# Patient Record
Sex: FEMALE | Race: WHITE | NOT HISPANIC OR LATINO | Employment: FULL TIME | URBAN - METROPOLITAN AREA
[De-identification: names, ages, dates, MRNs, and addresses within clinical notes are randomized per-mention and may not be internally consistent; named-entity substitution may affect disease eponyms.]

---

## 2017-09-28 ENCOUNTER — TRANSCRIBE ORDERS (OUTPATIENT)
Dept: URGENT CARE | Facility: MEDICAL CENTER | Age: 32
End: 2017-09-28

## 2017-09-28 ENCOUNTER — APPOINTMENT (OUTPATIENT)
Dept: LAB | Facility: MEDICAL CENTER | Age: 32
End: 2017-09-28

## 2017-09-28 ENCOUNTER — APPOINTMENT (OUTPATIENT)
Dept: URGENT CARE | Facility: MEDICAL CENTER | Age: 32
End: 2017-09-28

## 2017-09-28 DIAGNOSIS — Z02.1 PHYSICAL EXAM, PRE-EMPLOYMENT: ICD-10-CM

## 2017-09-28 DIAGNOSIS — Z02.1 PHYSICAL EXAM, PRE-EMPLOYMENT: Primary | ICD-10-CM

## 2017-09-28 PROCEDURE — 86787 VARICELLA-ZOSTER ANTIBODY: CPT

## 2017-09-28 PROCEDURE — 86480 TB TEST CELL IMMUN MEASURE: CPT

## 2017-09-28 PROCEDURE — 90715 TDAP VACCINE 7 YRS/> IM: CPT

## 2017-09-28 PROCEDURE — 36415 COLL VENOUS BLD VENIPUNCTURE: CPT

## 2017-09-30 LAB
ANNOTATION COMMENT IMP: NORMAL
GAMMA INTERFERON BACKGROUND BLD IA-ACNC: 0.05 IU/ML
M TB IFN-G BLD-IMP: NEGATIVE
M TB IFN-G CD4+ BCKGRND COR BLD-ACNC: <0.01 IU/ML
M TB IFN-G CD4+ T-CELLS BLD-ACNC: 0.04 IU/ML
MITOGEN IGNF BLD-ACNC: 9.26 IU/ML
QUANTIFERON-TB GOLD IN TUBE: NORMAL
SERVICE CMNT-IMP: NORMAL

## 2017-10-03 LAB — VZV IGG SER IA-ACNC: NORMAL

## 2018-01-11 ENCOUNTER — OFFICE VISIT (OUTPATIENT)
Dept: URGENT CARE | Facility: MEDICAL CENTER | Age: 33
End: 2018-01-11
Payer: COMMERCIAL

## 2018-01-11 ENCOUNTER — APPOINTMENT (OUTPATIENT)
Dept: RADIOLOGY | Facility: MEDICAL CENTER | Age: 33
End: 2018-01-11
Payer: COMMERCIAL

## 2018-01-11 DIAGNOSIS — S67.10XA CRUSHING INJURY OF FINGER: ICD-10-CM

## 2018-01-11 PROCEDURE — 73140 X-RAY EXAM OF FINGER(S): CPT

## 2018-01-11 PROCEDURE — S9088 SERVICES PROVIDED IN URGENT: HCPCS

## 2018-01-11 PROCEDURE — 99204 OFFICE O/P NEW MOD 45 MIN: CPT

## 2018-01-12 NOTE — PROGRESS NOTES
Assessment   1  Injury, crush, finger (927 3) (S67 10XA)   2  Subungual hematoma, fingernail (923 3) (S60 10XA)    Plan   Injury, crush, finger    · Motrin  MG Oral Tablet; 600 mg PO NOW; To Be Done: [id number]   · * XR FINGER RIGHT SECOND DIGIT-INDEX; Status:Complete - Retrospective By    Protocol Authorization;   Done: [id number] 10:05AM   · Finger splint; Status:Complete;   Done: [id number]    Discussion/Summary   Discussion Summary:    34 yo F with R 1st digit traumatic subungal hematoma s/p trephination with electrocautery  on: stretches, gentle massage  and NSAIDs as needed  ice locally  affected extremity when at rest   locally to nailbed and to avoid submerging R hand in water  up with PCP as discussed or go to nearest ED if exacerbation of symptoms  Medication Side Effects Reviewed: Possible side effects of new medications were reviewed with the patient/guardian today  Counseling Documentation With Imm: The patient was counseled regarding instructions for management,-- impressions  Follow Up Instructions: Follow Up with your Primary Care Provider in 3-5 days  If your symptoms worsen, go to the nearest [place] [address] Emergency Department  Chief Complaint   1  Finger Problem  Chief Complaint Free Text Note Form: Slammed right index finger in car door this am finger has slight blood around nail bed and blood under nail  History of Present Illness   HPI: Patient presents today with R index finger injury  Reports slammed finger in car door this AM  No otc meds taken  Reports pain and tingling locally  No additional symptoms present  Hospital Based Practices Required Assessment:      Pain Assessment      the patient states they have pain  (on a scale of 0 to 10, the patient rates the pain at 4 )      Abuse And Domestic Violence Screen       Yes, the patient is safe at home  -- The patient states no one is hurting them  Depression And Suicide Screen   No, the patient has not had thoughts of hurting themself  No, the patient has not felt depressed in the past 7 days  Prefered Language is  [place]  Primary Language is  English  Review of Systems   Focused-Female:      Constitutional: No fever, no chills, feels well, no tiredness, no recent weight gain or loss  Cardiovascular: no complaints of slow or fast heart rate, no chest pain, no palpitations, no leg claudication or lower extremity edema  Respiratory: no complaints of shortness of breath, no wheezing, no dyspnea on exertion, no orthopnea or PND  Integumentary: skin wound  Active Problems   1  Injury, crush, finger (927 3) (S67 10XA)    Past Medical History   Active Problems And Past Medical History Reviewed: The active problems and past medical history were reviewed and updated today  Family History   Family History Reviewed: The family history was reviewed and updated today  Social History   Social History Reviewed: The social history was reviewed and updated today  The social history was reviewed and is unchanged  Surgical History   Surgical History Reviewed: The surgical history was reviewed and updated today  Current Meds   Medication List Reviewed: The medication list was reviewed and updated today  Allergies   1  No Known Drug Allergies    Vitals   Signs   Recorded: [id number] 10:04AM   Temperature: 96 5 F  Heart Rate: 74  Respiration: 16  Systolic: 986, LUE, Sitting  Diastolic: 72, LUE, Sitting  BP Cuff Size: Standard  BP Comments: Tony  O2 Saturation: 99  Pain Scale: 4    Physical Exam        Constitutional      General appearance: No acute distress, well appearing and well nourished  -- Pleasant and cooperative  No acute distress  Eyes      Conjunctiva and lids: No swelling, erythema or discharge  Pulmonary      Respiratory effort: No increased work of breathing or signs of respiratory distress         Auscultation of lungs: Clear to auscultation  Cardiovascular      Palpation of heart: Normal PMI, no thrills  Auscultation of heart: Normal rate and rhythm, normal S1 and S2, without murmurs  Examination of extremities for edema and/or varicosities: Normal        Musculoskeletal      Gait and station: Normal        Digits and nails: Abnormal  -- Nailbed of R index finger noted to have hematoma under nail, TTP locally as well as over DIP  + sensation, + cap refill, + pulses, Remainder of R hand wnl  Skin      Skin and subcutaneous tissue: Abnormal        Psychiatric      Orientation to person, place, and time: Normal        Mood and affect: Normal        Results/Data   * XR FINGER RIGHT SECOND DIGIT-INDEX [id number] 10:05AM Ge Simon Order Number: [id number]      Performing Comments: room 5      Test Name Result Flag Reference   XR FINGER RIGHT SECOND DIGIT-INDEX (Report)     RIGHT FINGER           INDICATION: Trauma  Injured 2nd finger in car door  Bruising and bleeding at nail bed  COMPARISON: None           VIEWS:  PA view hand and 2 cone-down views of the right 2nd digit           IMAGES: 3           For the purposes of institution wide universal language the following terms will apply: (thumb=1st digit/finger, index finger=2nd digit/finger, long finger=3rd digit/finger, ring=4th digit/finger and small finger=5th digit/finger)           FINDINGS:           There is no acute fracture or dislocation  No significant degenerative changes  No lytic or blastic lesion  Soft tissues are unremarkable  IMPRESSION:      No acute fracture or radiopaque foreign body  Workstation performed: [id number]           Signed by:      Jeremiah Kelly DO      1/11/18        Provider Comments   Provider Comments: In office x ray of R index finger interpreted as negative  Finger splint applied   Recommend symptomatic management and follow up with PCP if symptoms persist       Signatures    Electronically signed by : Erik Swann, [address]; Jan 11 2018 11:24AM EST                       (Author)     Electronically signed by : ERICKA Dolan ; Jan 11 2018 12:11PM EST

## 2018-01-23 VITALS
RESPIRATION RATE: 16 BRPM | DIASTOLIC BLOOD PRESSURE: 72 MMHG | OXYGEN SATURATION: 99 % | HEART RATE: 74 BPM | TEMPERATURE: 96.5 F | SYSTOLIC BLOOD PRESSURE: 108 MMHG

## 2018-03-22 ENCOUNTER — TELEPHONE (OUTPATIENT)
Dept: NEUROLOGY | Facility: CLINIC | Age: 33
End: 2018-03-22

## 2018-05-16 ENCOUNTER — OFFICE VISIT (OUTPATIENT)
Dept: NEUROLOGY | Facility: CLINIC | Age: 33
End: 2018-05-16
Payer: COMMERCIAL

## 2018-05-16 VITALS
HEIGHT: 63 IN | BODY MASS INDEX: 23.25 KG/M2 | DIASTOLIC BLOOD PRESSURE: 65 MMHG | HEART RATE: 74 BPM | WEIGHT: 131.2 LBS | SYSTOLIC BLOOD PRESSURE: 110 MMHG | RESPIRATION RATE: 16 BRPM

## 2018-05-16 DIAGNOSIS — G40.109 LOCALIZATION-RELATED EPILEPSY (HCC): Primary | ICD-10-CM

## 2018-05-16 PROCEDURE — 99205 OFFICE O/P NEW HI 60 MIN: CPT | Performed by: PSYCHIATRY & NEUROLOGY

## 2018-05-16 RX ORDER — LAMOTRIGINE 200 MG/1
200 TABLET ORAL 2 TIMES DAILY
Qty: 180 TABLET | Refills: 3 | Status: SHIPPED | OUTPATIENT
Start: 2018-05-16 | End: 2019-02-01 | Stop reason: SDUPTHER

## 2018-05-16 RX ORDER — LAMOTRIGINE 150 MG/1
TABLET ORAL
COMMUNITY
Start: 2018-03-02 | End: 2018-05-16 | Stop reason: SDUPTHER

## 2018-05-16 RX ORDER — FOLIC ACID 1 MG/1
TABLET ORAL
COMMUNITY
Start: 2017-09-27

## 2018-05-16 NOTE — PROGRESS NOTES
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 [address] )  Based on the description of her episodes, these are most consistent with complex partial seizures, with prior generalized tonic clonic seizures  Her initial aura of jamais vu followed by inability to speak would be suggestive of left temporal onset  She still has been having smaller seizures even when taking this amount of lamotrigine, so I think it would be best to increase her dose further to avoid any additional seizures  -- I will have her increase lamtorigine to 200 mg twice a day  If she continues to have seizures, her dose could be increased further  -- She is planning to get pregnant  We extensively discussed pregnancy issues in women with epilepsy  In general, lamotrigine is not associated with an increased risk of birth defects compared to women not taking any medications  Although I agree that she should be on the lease amount of medication necessary to maintain seizure freedom, she still is having seizures on her current dose of medications  -- after she is at the goal dose of lamotrigine for about 2 weeks, I will have her get a lamotrigine level checked as a baseline if she would get pregnant  During pregnancy, lamotrigine levels often fall, so we will need to monitor her bloodwork to maintain her drug levels if she would get pregnant  -- I discussed seizure first aid, seizure safety, and driving restrictions  Her last seizures was over 6 months ago, so she can continue to drive, but if she would have another seizure with alteration of consciousness, then she cannot drive for 6 months from that event       I spent a total of 60 min with the patient with greater than 50% of that time spent counseling and coordinating her care, specifically discussing her diagnosis,   seizure safety precautions,  seizure first aid, driving restrictions, women's issues/pregancy issues related to epilepsy, as detailed above       She will return to the office in about 3 months  Subjective:    HPI    Current seizure medications:  1  Lamotrigine 150 mg twice a day  Other medications as per Epic    Briefly reviewing her history, she had her first seizure when she was about 13to 17years old, which initially manifested just as staring spells  She eventually saw neurology at [facility] and was started on lamotrigine  As they increased her dose, her events stopped  She was only on this for a few months, and then stopped taking lamotrigine, but within 24 hours of stopping the medication, she had 2 generalized tonic clonic seizures  She does not recall what exactly happened with theses seizures  She was then put back on lamotrigine and continued to take this since then  She continues to have episodes of "petit mal" seizures, where she will get a feeling of jamais vu followed by trouble getting words out and swallowing repetitively  These are rare, occurring about every 6-12 months  Her last episode occurred at least 6 months ago  She has been tolerating Lamotrigine well without any side effects  Her prior neurologist was trying to decrease her dose to reduce the risk of birth defects, as she is considering getting pregnant  Event/Seizure semiology:  1   "petit mal seizures"  Likely represent complex partial seizures  Begin with a feeling of jamais vu followed by inability to speak and "not making sense, often with repetitive swallowing behavior  Occur about 1-2 times a year  Last occurred over 6 months ago  Special Features  Status epilepticus: no  Self Injury Seizures: no  Precipitating Factors: stress or menses    Epilepsy Risk Factors:  Uncomplicated pregnancy with normal development  No learning disabilities or cognitive delay   No h/o febrile seizures, CNS infections, strokes, or CNS neoplasms  There is no family history of seizures or epilepsy  Prior AEDs:  none    Prior Evaluation:  - MRI brain: long time ago  - Routine EEG: long time ago  - Video EEG: none  I reviewed prior records from [facility], as documented in Epic/[employer], and summarized above  The following portions of the patient's history were reviewed and updated as appropriate: allergies, current medications, past family history, past medical history, past social history, past surgical history and problem list          Objective:    Blood pressure 110/65, pulse 74, resp  rate 16, height 5' 3" (1 6 m), weight 59 5 kg (131 lb 3 2 oz)  Physical Exam    Neurological Exam  GENERAL EXAMINATION:   In general patient is well appearing and in no distress  There is no peripheral edema  NEUROLOGIC EXAMINATION:     Alert and oriented to person, date, location  Fund of knowledge is full with good understanding of medical situation  Recent and remote memory were intact    Mood and affect are appropriate  Attention is intact  Language function including fluency, naming, and comprehension intact  Cranial nerves: Pupils are equal round reactive to light and accommodation  Visual Fields are full to confrontation bilaterally  Optic discs are sharp with no evidence of papilledema Extraocular movements are intact without nystagmus  Facial sensation is intact to light touch  No facial droop, face activates symmetrically  There is no dysarthria  Hearing was intact to finger rub  Tongue and uvula are midline and palate elevates symmetrically  Shoulder shrug  5/5  Motor Exam:  No pronator drift  Bulk and tone are normal  Strength is 5/5 throughout  Deep tendon reflexes: Biceps 2+, brachioradialis 2+, patellar 2+, Achilles 2+ bilaterally  Negative Liceas      Sensation: Intact light touch    Coordination: Finger nose finger and heel to shin testing are without dysmetria  Gait: Negative romberg  Normal casual gait  ROS:    Review of Systems   Constitutional: Negative  Negative for appetite change and fever  HENT: Negative  Negative for hearing loss, tinnitus, trouble swallowing and voice change  Eyes: Negative  Negative for photophobia and pain  Respiratory: Negative  Negative for shortness of breath  Cardiovascular: Negative  Negative for palpitations  Gastrointestinal: Negative  Negative for nausea and vomiting  Endocrine: Negative  Negative for cold intolerance and heat intolerance  Genitourinary: Negative  Negative for dysuria, frequency and urgency  Musculoskeletal: Negative  Negative for myalgias and neck pain  Skin: Negative  Negative for rash  Allergic/Immunologic: Negative  Neurological: Negative  Negative for dizziness, tremors, seizures, syncope, facial asymmetry, speech difficulty, weakness, light-headedness, numbness and headaches  Hematological: Negative  Does not bruise/bleed easily  Psychiatric/Behavioral: Positive for sleep disturbance  Negative for confusion and hallucinations

## 2018-05-16 NOTE — PATIENT INSTRUCTIONS
-- Increase your Lamotrigine to 200 mg twice a day  -- Please get a lamotrigine level checked about 2 weeks after you increase the dose  -- If you happen to get pregnant, please call us to give us the good news, but also so we can arrange to follow your Lamotrigine levels through the pregnancy  -- continue to take folic acid daily

## 2018-05-31 NOTE — ASSESSMENT & PLAN NOTE
Based on the description of her episodes, these are most consistent with complex partial seizures, with prior generalized tonic clonic seizures  Her initial aura of jamais vu followed by inability to speak would be suggestive of left temporal onset  She still has been having smaller seizures even when taking this amount of lamotrigine, so I think it would be best to increase her dose further to avoid any additional seizures  -- I will have her increase lamtorigine to 200 mg twice a day  If she continues to have seizures, her dose could be increased further  -- She is planning to get pregnant  We extensively discussed pregnancy issues in women with epilepsy  In general, lamotrigine is not associated with an increased risk of birth defects compared to women not taking any medications  Although I agree that she should be on the lease amount of medication necessary to maintain seizure freedom, she still is having seizures on her current dose of medications  -- after she is at the goal dose of lamotrigine for about 2 weeks, I will have her get a lamotrigine level checked as a baseline if she would get pregnant  During pregnancy, lamotrigine levels often fall, so we will need to monitor her bloodwork to maintain her drug levels if she would get pregnant  -- I discussed seizure first aid, seizure safety, and driving restrictions  Her last seizures was over 6 months ago, so she can continue to drive, but if she would have another seizure with alteration of consciousness, then she cannot drive for 6 months from that event  -- she will continue to take folic aid 1 mg daily to reduce the risk of birth defects

## 2018-07-26 ENCOUNTER — TRANSCRIBE ORDERS (OUTPATIENT)
Dept: ADMINISTRATIVE | Facility: HOSPITAL | Age: 33
End: 2018-07-26

## 2018-07-26 ENCOUNTER — APPOINTMENT (OUTPATIENT)
Dept: LAB | Facility: MEDICAL CENTER | Age: 33
End: 2018-07-26
Payer: COMMERCIAL

## 2018-07-26 DIAGNOSIS — Z00.8 HEALTH EXAMINATION IN POPULATION SURVEY: Primary | ICD-10-CM

## 2018-07-26 DIAGNOSIS — Z00.8 HEALTH EXAMINATION IN POPULATION SURVEY: ICD-10-CM

## 2018-07-26 DIAGNOSIS — G40.109 LOCALIZATION-RELATED EPILEPSY (HCC): ICD-10-CM

## 2018-07-26 LAB
CHOLEST SERPL-MCNC: 262 MG/DL (ref 50–200)
EST. AVERAGE GLUCOSE BLD GHB EST-MCNC: 97 MG/DL
HBA1C MFR BLD: 5 % (ref 4.2–6.3)
HDLC SERPL-MCNC: 73 MG/DL (ref 40–60)
LDLC SERPL CALC-MCNC: 176 MG/DL (ref 0–100)
NONHDLC SERPL-MCNC: 189 MG/DL
TRIGL SERPL-MCNC: 65 MG/DL

## 2018-07-26 PROCEDURE — 80061 LIPID PANEL: CPT

## 2018-07-26 PROCEDURE — 36415 COLL VENOUS BLD VENIPUNCTURE: CPT

## 2018-07-26 PROCEDURE — 80175 DRUG SCREEN QUAN LAMOTRIGINE: CPT

## 2018-07-26 PROCEDURE — 83036 HEMOGLOBIN GLYCOSYLATED A1C: CPT

## 2018-07-30 LAB — LAMOTRIGINE SERPL-MCNC: 8.3 UG/ML (ref 2–20)

## 2018-08-21 ENCOUNTER — TELEPHONE (OUTPATIENT)
Dept: NEUROLOGY | Facility: CLINIC | Age: 33
End: 2018-08-21

## 2018-09-12 ENCOUNTER — ANNUAL EXAM (OUTPATIENT)
Dept: OBGYN CLINIC | Facility: CLINIC | Age: 33
End: 2018-09-12
Payer: COMMERCIAL

## 2018-09-12 VITALS
DIASTOLIC BLOOD PRESSURE: 80 MMHG | HEIGHT: 63 IN | WEIGHT: 129 LBS | SYSTOLIC BLOOD PRESSURE: 110 MMHG | BODY MASS INDEX: 22.86 KG/M2

## 2018-09-12 DIAGNOSIS — N63.11 BREAST LUMP ON RIGHT SIDE AT 10 O'CLOCK POSITION: ICD-10-CM

## 2018-09-12 DIAGNOSIS — R30.0 DYSURIA: ICD-10-CM

## 2018-09-12 DIAGNOSIS — Z01.419 ENCOUNTER FOR ANNUAL ROUTINE GYNECOLOGICAL EXAMINATION: Primary | ICD-10-CM

## 2018-09-12 DIAGNOSIS — Z31.69 INFERTILITY COUNSELING: ICD-10-CM

## 2018-09-12 PROCEDURE — 99385 PREV VISIT NEW AGE 18-39: CPT | Performed by: OBSTETRICS & GYNECOLOGY

## 2018-09-12 PROCEDURE — 87624 HPV HI-RISK TYP POOLED RSLT: CPT | Performed by: OBSTETRICS & GYNECOLOGY

## 2018-09-12 PROCEDURE — G0145 SCR C/V CYTO,THINLAYER,RESCR: HCPCS | Performed by: OBSTETRICS & GYNECOLOGY

## 2018-09-12 RX ORDER — PHENAZOPYRIDINE HYDROCHLORIDE 100 MG/1
100 TABLET, FILM COATED ORAL 3 TIMES DAILY PRN
Qty: 9 TABLET | Refills: 0 | Status: SHIPPED | OUTPATIENT
Start: 2018-09-12 | End: 2018-09-26

## 2018-09-12 RX ORDER — NITROFURANTOIN 25; 75 MG/1; MG/1
100 CAPSULE ORAL 2 TIMES DAILY
Qty: 10 CAPSULE | Refills: 0 | Status: SHIPPED | OUTPATIENT
Start: 2018-09-12 | End: 2018-09-26

## 2018-09-12 NOTE — PROGRESS NOTES
Assessment/Plan:    Pap smear done as well as annual   Encouraged self-breast examination as well as calcium supplementation  Recommend check urinalysis, culture and sensitivity  She will start Macrobid b i d  x5 days as well as Pyridium  Also recommend right breast ultrasound attention upper outer quadrant  I will notify her the above results via telephone  Next we had an extensive conversation regarding primary infertility with workup including labs as well as semen analysis  At this point we will hold until January certainly if she is pregnant she will notify us sooner  No problem-specific Assessment & Plan notes found for this encounter  Diagnoses and all orders for this visit:    Encounter for annual routine gynecological examination  -     Liquid-based pap, screening    Infertility counseling    Dysuria  -     Urinalysis with microscopic; Future  -     Urine culture; Future  -     nitrofurantoin (MACROBID) 100 mg capsule; Take 1 capsule (100 mg total) by mouth 2 (two) times a day  -     phenazopyridine (PYRIDIUM) 100 mg tablet; Take 1 tablet (100 mg total) by mouth 3 (three) times a day as needed for bladder spasms    Breast lump on right side at 10 o'clock position  -     US breast right limited (diagnostic); Future          Subjective:      Patient ID: Diane Bermudez is a 29 y o  female  HPI     This is a 70-year-old female G0 who presents as a new patient for annual well gyn exam   Patient has a significant past medical history of epilepsy  Her last grand mal seizure was approximately 10 years ago  Her last partial seizure was approximately 1 year ago  Her medication has been recently altered  Her neurologist is aware that she is trying to get pregnant for the last 8 months  She states her  has never fathered a child, although and is previous relationship she did have a miscarriage this was approximately 14 years ago     She states her cycles are regular every 28 days lasting 4 days with no breakthrough bleeding  She denies any changes in bowel habits  More recently she is complaining of dysuria with microscopic hematuria  Her last bladder infection was a couple of years ago  She has been in a monogamous relationship for over 5 years  Her Pap smears have been normal   Patient is employed as a  in pediatric neuro development  The following portions of the patient's history were reviewed and updated as appropriate: allergies, current medications, past family history, past medical history, past social history, past surgical history and problem list     Review of Systems   Constitutional: Negative for fatigue, fever and unexpected weight change  Respiratory: Negative for cough, chest tightness, shortness of breath and wheezing  Cardiovascular: Negative  Negative for chest pain and palpitations  Gastrointestinal: Negative  Negative for abdominal distention, abdominal pain, blood in stool, constipation, diarrhea, nausea and vomiting  Genitourinary: Positive for dysuria  Negative for difficulty urinating, dyspareunia, flank pain, frequency, genital sores, hematuria, pelvic pain, urgency, vaginal bleeding, vaginal discharge and vaginal pain  Skin: Negative for rash  Objective:      /80   Ht 5' 3" (1 6 m)   Wt 58 5 kg (129 lb)   LMP 08/21/2018   Breastfeeding? No   BMI 22 85 kg/m²          Physical Exam   Constitutional: She appears well-developed and well-nourished  Cardiovascular: Normal rate and regular rhythm  Pulmonary/Chest: Effort normal and breath sounds normal  Right breast exhibits mass  Right breast exhibits no inverted nipple, no nipple discharge, no skin change and no tenderness  Left breast exhibits no inverted nipple, no mass, no nipple discharge, no skin change and no tenderness  + upper outer quadrant   Abdominal: Soft  Bowel sounds are normal  She exhibits no distension  There is no tenderness   There is no rebound and no guarding  Genitourinary: Vagina normal and uterus normal  There is no lesion on the right labia  There is no lesion on the left labia  Cervix exhibits no discharge and no friability  Right adnexum displays no mass, no tenderness and no fullness  Left adnexum displays no mass, no tenderness and no fullness  No vaginal discharge found

## 2018-09-13 ENCOUNTER — APPOINTMENT (OUTPATIENT)
Dept: LAB | Facility: HOSPITAL | Age: 33
End: 2018-09-13
Attending: OBSTETRICS & GYNECOLOGY
Payer: COMMERCIAL

## 2018-09-13 DIAGNOSIS — R30.0 DYSURIA: ICD-10-CM

## 2018-09-13 LAB
BACTERIA UR QL AUTO: ABNORMAL /HPF
BILIRUB UR QL STRIP: NEGATIVE
CLARITY UR: ABNORMAL
COLOR UR: YELLOW
GLUCOSE UR STRIP-MCNC: NEGATIVE MG/DL
HGB UR QL STRIP.AUTO: ABNORMAL
HYALINE CASTS #/AREA URNS LPF: ABNORMAL /LPF
KETONES UR STRIP-MCNC: NEGATIVE MG/DL
LEUKOCYTE ESTERASE UR QL STRIP: ABNORMAL
NITRITE UR QL STRIP: POSITIVE
NON-SQ EPI CELLS URNS QL MICRO: ABNORMAL /HPF
PH UR STRIP.AUTO: 6 [PH] (ref 4.5–8)
PROT UR STRIP-MCNC: NEGATIVE MG/DL
RBC #/AREA URNS AUTO: ABNORMAL /HPF
SP GR UR STRIP.AUTO: 1.01 (ref 1–1.03)
UROBILINOGEN UR QL STRIP.AUTO: 0.2 E.U./DL
WBC #/AREA URNS AUTO: ABNORMAL /HPF

## 2018-09-13 PROCEDURE — 87077 CULTURE AEROBIC IDENTIFY: CPT

## 2018-09-13 PROCEDURE — 87086 URINE CULTURE/COLONY COUNT: CPT

## 2018-09-13 PROCEDURE — 87186 SC STD MICRODIL/AGAR DIL: CPT

## 2018-09-13 PROCEDURE — 81001 URINALYSIS AUTO W/SCOPE: CPT

## 2018-09-14 LAB
HPV HR 12 DNA CVX QL NAA+PROBE: NEGATIVE
HPV16 DNA CVX QL NAA+PROBE: NEGATIVE
HPV18 DNA CVX QL NAA+PROBE: NEGATIVE
LAB AP GYN PRIMARY INTERPRETATION: NORMAL
LAB AP LMP: NORMAL
Lab: NORMAL

## 2018-09-15 LAB — BACTERIA UR CULT: ABNORMAL

## 2018-09-17 ENCOUNTER — TELEPHONE (OUTPATIENT)
Dept: OBGYN CLINIC | Facility: CLINIC | Age: 33
End: 2018-09-17

## 2018-09-17 NOTE — TELEPHONE ENCOUNTER
Lm pt's as re: urine C&S results (+) E coli >100,000 susceptible to Macrobid  Pt was prescribed Macrobid & Pyridium @ appt 9/12/18

## 2018-09-17 NOTE — TELEPHONE ENCOUNTER
----- Message from Eunice Villavicencio DO sent at 9/16/2018  6:58 PM EDT -----  Inform pt UA +diana, she is already on macrobid

## 2018-09-18 ENCOUNTER — HOSPITAL ENCOUNTER (OUTPATIENT)
Dept: ULTRASOUND IMAGING | Facility: CLINIC | Age: 33
Discharge: HOME | End: 2018-09-18
Payer: COMMERCIAL

## 2018-09-18 DIAGNOSIS — N63.11 BREAST LUMP ON RIGHT SIDE AT 10 O'CLOCK POSITION: ICD-10-CM

## 2018-09-18 PROCEDURE — 76642 ULTRASOUND BREAST LIMITED: CPT

## 2018-09-19 ENCOUNTER — TELEPHONE (OUTPATIENT)
Dept: OBGYN CLINIC | Facility: CLINIC | Age: 33
End: 2018-09-19

## 2018-09-19 NOTE — TELEPHONE ENCOUNTER
----- Message from Brenda Teague DO sent at 9/18/2018  4:25 PM EDT -----  Informed patient breast ultrasound reveals fibroglandular tissue  No further workup needed resume annual gyn exam encouraged self-breast examination

## 2018-09-26 ENCOUNTER — OFFICE VISIT (OUTPATIENT)
Dept: NEUROLOGY | Facility: CLINIC | Age: 33
End: 2018-09-26
Payer: COMMERCIAL

## 2018-09-26 VITALS
SYSTOLIC BLOOD PRESSURE: 110 MMHG | DIASTOLIC BLOOD PRESSURE: 60 MMHG | BODY MASS INDEX: 22.32 KG/M2 | HEIGHT: 63 IN | WEIGHT: 126 LBS | HEART RATE: 60 BPM

## 2018-09-26 DIAGNOSIS — G40.109 LOCALIZATION-RELATED EPILEPSY (HCC): Primary | ICD-10-CM

## 2018-09-26 PROCEDURE — 99213 OFFICE O/P EST LOW 20 MIN: CPT | Performed by: PSYCHIATRY & NEUROLOGY

## 2018-09-26 NOTE — PATIENT INSTRUCTIONS
-- Continue to take lamotrigine 200 mg twice a day  -- When you get pregnant, give our office a call  We will need to check lamotrigine levels periodically while you are pregnant to make sure the level doesn't fall  -- If you have any problems or issues, please give us a call

## 2018-09-26 NOTE — PROGRESS NOTES
Patient ID: Marissa Bruno is a 29 y o  female with likely localization related epilepsy, who is returning to Neurology office for follow up of her seizures  Assessment/Plan:    Localization-related epilepsy ([address] )  She has been doing quite well with the increased dose of her Lamotrigine, denying any further seizures, including simple partial seizures of jamais vu  Because she is doing well, I will not make any changes to her medications  She recently had a lamotrigine level, which was 8 3  This will be a good baseline level for us to maintain when she would become pregnant  We again discussed pregnancy issues related to epilepsy and that when she becomes pregnant, we will need to follow her lamotrigine levels, since lamotrigine levels often fall during pregnancy requiring increased doses during the pregnancy to maintain the same level  -- she will continue lamotrigine 200 mg twice a day  If her seizures would return, we could increase the dose  -- she will continue folic acid and prenatal vitamin supplements  When she becomes pregnant, she will give our office a call so that we can follow her blood levels of lamotrigine more closely  She will return to the office in about 4 months  Subjective:    HPI  Current seizure medications:  1  Lamotrigine 200 mg twice a day  Other medications as per Epic  I last saw her in the office on 5/16/2018  At that time, she was seen as an initial visit and had overall been doing well without any larger seizures since being on lamotrigine monotherapy  She previously had been trying to decrease her medications, with plans of getting pregnant, but was still having occasional simple partial seizures of a sensation of jamais vu  To try to prevent additional seizures, her dose of Lamotrigine was increased to 200 mg twice a day  Since her last visit, she increased her Lamotrigine as instructed   She has not had any further seizures, also denying any episodes of genesis patel since increasing her dose  She denies any side effects to the increased dose of lamotrigine as well  She overall has been doing great  She has been trying to get pregnant and has been following with her OB/GYN for prenatal care  She continues folic acid and prenatal vitamins  Prior Medications: none      The following portions of the patient's history were reviewed and updated as appropriate: allergies, current medications and problem list      Objective:    Blood pressure 110/60, pulse 60, height 5' 3" (1 6 m), weight 57 2 kg (126 lb), not currently breastfeeding  Physical Exam    Neurological Exam      ROS:    Review of Systems   Constitutional: Negative  Negative for appetite change and fever  HENT: Negative  Negative for hearing loss, tinnitus, trouble swallowing and voice change  Eyes: Negative  Negative for photophobia and pain  Respiratory: Negative  Negative for shortness of breath  Cardiovascular: Negative  Negative for palpitations  Gastrointestinal: Negative  Negative for nausea and vomiting  Endocrine: Negative  Negative for cold intolerance and heat intolerance  Genitourinary: Negative  Negative for dysuria, frequency and urgency  Musculoskeletal: Negative  Negative for myalgias and neck pain  Skin: Negative  Negative for rash  Allergic/Immunologic: Negative  Neurological: Negative  Negative for dizziness, tremors, seizures, syncope, facial asymmetry, speech difficulty, weakness, light-headedness, numbness and headaches  Hematological: Negative  Does not bruise/bleed easily  Psychiatric/Behavioral: Negative  Negative for confusion, hallucinations and sleep disturbance         I personally reviewed the ROS that was entered by the medical assistant

## 2018-09-27 NOTE — ASSESSMENT & PLAN NOTE
She has been doing quite well with the increased dose of her Lamotrigine, denying any further seizures, including simple partial seizures of jamais vu  Because she is doing well, I will not make any changes to her medications  She recently had a lamotrigine level, which was 8 3  This will be a good baseline level for us to maintain when she would become pregnant  We again discussed pregnancy issues related to epilepsy and that when she becomes pregnant, we will need to follow her lamotrigine levels, since lamotrigine levels often fall during pregnancy requiring increased doses during the pregnancy to maintain the same level  -- she will continue lamotrigine 200 mg twice a day  If her seizures would return, we could increase the dose  -- she will continue folic acid and prenatal vitamin supplements  When she becomes pregnant, she will give our office a call so that we can follow her blood levels of lamotrigine more closely

## 2018-10-09 DIAGNOSIS — G40.109 LOCALIZATION-RELATED EPILEPSY (HCC): ICD-10-CM

## 2018-10-09 RX ORDER — LAMOTRIGINE 200 MG/1
200 TABLET ORAL 2 TIMES DAILY
Qty: 180 TABLET | Refills: 0 | Status: CANCELLED | OUTPATIENT
Start: 2018-10-09

## 2018-10-10 ENCOUNTER — TELEPHONE (OUTPATIENT)
Dept: OBGYN CLINIC | Facility: CLINIC | Age: 33
End: 2018-10-10

## 2018-10-10 DIAGNOSIS — N30.00 ACUTE CYSTITIS WITHOUT HEMATURIA: Primary | ICD-10-CM

## 2018-10-10 RX ORDER — NITROFURANTOIN 25; 75 MG/1; MG/1
100 CAPSULE ORAL 2 TIMES DAILY
Qty: 14 CAPSULE | Refills: 0 | Status: SHIPPED | OUTPATIENT
Start: 2018-10-10 | End: 2018-10-17

## 2018-10-11 ENCOUNTER — APPOINTMENT (OUTPATIENT)
Dept: LAB | Facility: HOSPITAL | Age: 33
End: 2018-10-11
Attending: OBSTETRICS & GYNECOLOGY
Payer: COMMERCIAL

## 2018-10-11 DIAGNOSIS — N30.00 ACUTE CYSTITIS WITHOUT HEMATURIA: ICD-10-CM

## 2018-10-11 LAB
BACTERIA UR QL AUTO: ABNORMAL /HPF
BILIRUB UR QL STRIP: NEGATIVE
CLARITY UR: CLEAR
COLOR UR: YELLOW
GLUCOSE UR STRIP-MCNC: NEGATIVE MG/DL
HGB UR QL STRIP.AUTO: NEGATIVE
HYALINE CASTS #/AREA URNS LPF: ABNORMAL /LPF
KETONES UR STRIP-MCNC: NEGATIVE MG/DL
LEUKOCYTE ESTERASE UR QL STRIP: NEGATIVE
NITRITE UR QL STRIP: NEGATIVE
NON-SQ EPI CELLS URNS QL MICRO: ABNORMAL /HPF
PH UR STRIP.AUTO: 6 [PH] (ref 4.5–8)
PROT UR STRIP-MCNC: NEGATIVE MG/DL
RBC #/AREA URNS AUTO: ABNORMAL /HPF
SP GR UR STRIP.AUTO: 1.01 (ref 1–1.03)
UROBILINOGEN UR QL STRIP.AUTO: 0.2 E.U./DL
WBC #/AREA URNS AUTO: ABNORMAL /HPF

## 2018-10-11 PROCEDURE — 87086 URINE CULTURE/COLONY COUNT: CPT

## 2018-10-11 PROCEDURE — 81001 URINALYSIS AUTO W/SCOPE: CPT

## 2018-10-12 LAB — BACTERIA UR CULT: NORMAL

## 2018-10-16 ENCOUNTER — TELEPHONE (OUTPATIENT)
Dept: OBGYN CLINIC | Facility: CLINIC | Age: 33
End: 2018-10-16

## 2018-11-05 ENCOUNTER — OFFICE VISIT (OUTPATIENT)
Dept: FAMILY MEDICINE CLINIC | Facility: CLINIC | Age: 33
End: 2018-11-05
Payer: COMMERCIAL

## 2018-11-05 VITALS
DIASTOLIC BLOOD PRESSURE: 70 MMHG | TEMPERATURE: 98.1 F | BODY MASS INDEX: 22.64 KG/M2 | RESPIRATION RATE: 18 BRPM | WEIGHT: 127.8 LBS | HEIGHT: 63 IN | HEART RATE: 79 BPM | SYSTOLIC BLOOD PRESSURE: 110 MMHG | OXYGEN SATURATION: 98 %

## 2018-11-05 DIAGNOSIS — E78.00 ELEVATED LDL CHOLESTEROL LEVEL: ICD-10-CM

## 2018-11-05 DIAGNOSIS — R05.9 COUGH: Primary | ICD-10-CM

## 2018-11-05 DIAGNOSIS — J30.2 SEASONAL ALLERGIC RHINITIS, UNSPECIFIED TRIGGER: ICD-10-CM

## 2018-11-05 PROCEDURE — 99204 OFFICE O/P NEW MOD 45 MIN: CPT | Performed by: NURSE PRACTITIONER

## 2018-11-05 PROCEDURE — 1036F TOBACCO NON-USER: CPT | Performed by: NURSE PRACTITIONER

## 2018-11-05 PROCEDURE — 3008F BODY MASS INDEX DOCD: CPT | Performed by: NURSE PRACTITIONER

## 2018-11-05 RX ORDER — FLUTICASONE PROPIONATE 50 MCG
1 SPRAY, SUSPENSION (ML) NASAL DAILY
Qty: 16 G | Refills: 1 | Status: SHIPPED | OUTPATIENT
Start: 2018-11-05

## 2018-11-05 NOTE — PATIENT INSTRUCTIONS

## 2018-11-05 NOTE — PROGRESS NOTES
Assessment/Plan:    No problem-specific Assessment & Plan notes found for this encounter  Diagnoses and all orders for this visit:    Cough  -     Lipid panel; Future  -     TSH, 3rd generation with Free T4 reflex; Future  -     Vitamin D 25 hydroxy; Future  -     Comprehensive metabolic panel; Future    Elevated LDL cholesterol level  -     Lipid panel; Future  -     TSH, 3rd generation with Free T4 reflex; Future  -     Vitamin D 25 hydroxy; Future  -     Comprehensive metabolic panel; Future    Seasonal allergic rhinitis, unspecified trigger  -     fluticasone (FLONASE) 50 mcg/act nasal spray; 1 spray into each nostril daily    Other orders  -     Cancel: SYRINGE/SINGLE-DOSE VIAL: influenza vaccine, [id number], quadrivalent, 0 5 mL, preservative-free, for patients 3+ yr (FLUZONE)          Subjective:   Chief Complaint   Patient presents with    Cough   Patient complain of a cough for over a month   Patient says she was prescribed Z pack and it helped a little, but she's still cough  Patient had her flu vaccine  About 3 weeks ago        Patient ID: Omer George is a 29 y o  female  HPI  URI sxs x one month w/ naggy cough  Given azithromycin at her place of appointment for URI sxs and cough - completed  3 days ago - feeling better  Still w/ runny nose  NO OTCs used  PMH - epilepsy on lamictal - follows w/ Dr [facility])  Labs reviewed   Lipid: 262/65/73/176  AIC 5 0  Recent diag of UTI on macrobid  Last urine cult no growth    Diet reviewed : high in red meats/ pork/ high chol foods frequently during the week  Had flu zone at work  The following portions of the patient's history were reviewed and updated as appropriate: allergies, past social history, past surgical history and problem list     Review of Systems   Constitutional: Negative  HENT: Positive for congestion, postnasal drip and sinus pressure  Negative for sinus pain and sore throat  Eyes: Negative      Respiratory: Positive for cough  Negative for shortness of breath and wheezing  Cardiovascular: Negative  Gastrointestinal: Negative  Endocrine: Negative  Genitourinary: Negative  Resolved UTI   Musculoskeletal: Negative  Allergic/Immunologic: Negative  Neurological: Negative  Hematological: Negative  Psychiatric/Behavioral: Negative  Objective:      /70 (BP Location: Left arm, Patient Position: Sitting, Cuff Size: Adult)   Pulse 79   Temp 98 1 °F (36 7 °C)   Resp 18   Ht 5' 3" (1 6 m)   Wt 58 kg (127 lb 12 8 oz)   SpO2 98%   BMI 22 64 kg/m²          Physical Exam   Constitutional: She is oriented to person, place, and time  She appears well-developed and well-nourished  No distress  HENT:   Head: Normocephalic  Right Ear: Tympanic membrane and external ear normal  No decreased hearing is noted  Left Ear: Tympanic membrane and external ear normal  No decreased hearing is noted  Nose: Mucosal edema present  Mouth/Throat: Oropharynx is clear and moist    Good transillumination through sinuses   Eyes: Pupils are equal, round, and reactive to light  Conjunctivae are normal    Neck: Normal range of motion  Neck supple  No thyromegaly present  Cardiovascular: Normal rate, regular rhythm, normal heart sounds and intact distal pulses  No murmur heard  Pulmonary/Chest: Effort normal and breath sounds normal  No respiratory distress  Abdominal: Soft  Bowel sounds are normal    Musculoskeletal: Normal range of motion  Lymphadenopathy:     She has no cervical adenopathy  Neurological: She is alert and oriented to person, place, and time  She has normal reflexes  No cranial nerve deficit  Coordination normal    Skin: Skin is warm and dry  Psychiatric: She has a normal mood and affect   Her behavior is normal  Judgment and thought content normal

## 2018-12-12 ENCOUNTER — APPOINTMENT (OUTPATIENT)
Dept: LAB | Facility: HOSPITAL | Age: 33
End: 2018-12-12
Payer: COMMERCIAL

## 2018-12-12 DIAGNOSIS — R05.9 COUGH: ICD-10-CM

## 2018-12-12 DIAGNOSIS — E78.00 ELEVATED LDL CHOLESTEROL LEVEL: ICD-10-CM

## 2018-12-12 LAB
25(OH)D3 SERPL-MCNC: 17 NG/ML (ref 30–100)
ALBUMIN SERPL BCP-MCNC: 4.1 G/DL (ref 3.5–5)
ALP SERPL-CCNC: 63 U/L (ref 46–116)
ALT SERPL W P-5'-P-CCNC: 17 U/L (ref 12–78)
ANION GAP SERPL CALCULATED.3IONS-SCNC: 10 MMOL/L (ref 4–13)
AST SERPL W P-5'-P-CCNC: 13 U/L (ref 5–45)
BILIRUB SERPL-MCNC: 0.41 MG/DL (ref 0.2–1)
BUN SERPL-MCNC: 15 MG/DL (ref 5–25)
CALCIUM SERPL-MCNC: 8.9 MG/DL (ref 8.3–10.1)
CHLORIDE SERPL-SCNC: 106 MMOL/L (ref 100–108)
CHOLEST SERPL-MCNC: 233 MG/DL (ref 50–200)
CO2 SERPL-SCNC: 24 MMOL/L (ref 21–32)
CREAT SERPL-MCNC: 0.95 MG/DL (ref 0.6–1.3)
GFR SERPL CREATININE-BSD FRML MDRD: 80 ML/MIN/1.73SQ M
GLUCOSE P FAST SERPL-MCNC: 77 MG/DL (ref 65–99)
HDLC SERPL-MCNC: 72 MG/DL (ref 40–60)
LDLC SERPL CALC-MCNC: 150 MG/DL (ref 0–100)
NONHDLC SERPL-MCNC: 161 MG/DL
POTASSIUM SERPL-SCNC: 4 MMOL/L (ref 3.5–5.3)
PROT SERPL-MCNC: 7.8 G/DL (ref 6.4–8.2)
SODIUM SERPL-SCNC: 140 MMOL/L (ref 136–145)
TRIGL SERPL-MCNC: 57 MG/DL
TSH SERPL DL<=0.05 MIU/L-ACNC: 3.37 UIU/ML (ref 0.36–3.74)

## 2018-12-12 PROCEDURE — 36415 COLL VENOUS BLD VENIPUNCTURE: CPT

## 2018-12-12 PROCEDURE — 80061 LIPID PANEL: CPT

## 2018-12-12 PROCEDURE — 82306 VITAMIN D 25 HYDROXY: CPT

## 2018-12-12 PROCEDURE — 80053 COMPREHEN METABOLIC PANEL: CPT

## 2018-12-12 PROCEDURE — 84443 ASSAY THYROID STIM HORMONE: CPT

## 2018-12-13 ENCOUNTER — OFFICE VISIT (OUTPATIENT)
Dept: FAMILY MEDICINE CLINIC | Facility: CLINIC | Age: 33
End: 2018-12-13
Payer: COMMERCIAL

## 2018-12-13 VITALS
SYSTOLIC BLOOD PRESSURE: 90 MMHG | RESPIRATION RATE: 16 BRPM | WEIGHT: 122.4 LBS | HEIGHT: 63 IN | DIASTOLIC BLOOD PRESSURE: 60 MMHG | TEMPERATURE: 98.1 F | BODY MASS INDEX: 21.69 KG/M2 | HEART RATE: 80 BPM | OXYGEN SATURATION: 97 %

## 2018-12-13 DIAGNOSIS — J30.2 SEASONAL ALLERGIC RHINITIS, UNSPECIFIED TRIGGER: ICD-10-CM

## 2018-12-13 DIAGNOSIS — E78.00 ELEVATED LDL CHOLESTEROL LEVEL: ICD-10-CM

## 2018-12-13 DIAGNOSIS — E55.9 VITAMIN D DEFICIENCY: Primary | ICD-10-CM

## 2018-12-13 PROCEDURE — 3008F BODY MASS INDEX DOCD: CPT | Performed by: NURSE PRACTITIONER

## 2018-12-13 PROCEDURE — 1036F TOBACCO NON-USER: CPT | Performed by: NURSE PRACTITIONER

## 2018-12-13 PROCEDURE — 99213 OFFICE O/P EST LOW 20 MIN: CPT | Performed by: NURSE PRACTITIONER

## 2018-12-13 RX ORDER — ERGOCALCIFEROL 1.25 MG/1
50000 CAPSULE ORAL WEEKLY
Qty: 8 CAPSULE | Refills: 0 | Status: SHIPPED | OUTPATIENT
Start: 2018-12-13 | End: 2020-05-13 | Stop reason: ALTCHOICE

## 2018-12-13 NOTE — PROGRESS NOTES
Assessment/Plan:    No problem-specific Assessment & Plan notes found for this encounter  Diagnoses and all orders for this visit:    Vitamin D deficiency  -     calcium carbonate-vitamin D (OSCAL-D) 500 mg-200 units per tablet; Take 1 tablet by mouth daily for 90 days  -     ergocalciferol (VITAMIN D2) 50,000 units; Take 1 capsule (50,000 Units total) by mouth once a week  -     Vitamin D 25 hydroxy; Future    Seasonal allergic rhinitis, unspecified trigger    Elevated LDL cholesterol level  -     Lipid panel; Future        Replace Vit D as above  Repeat blood in 4 months  Continue low cholesterol diet as LDL improved  Subjective:   Chief Complaint   Patient presents with    Follow-up     1 Month     Patient says her cough is better   Patient says she had her flu vac but can't remember    Labs done 12/12/18         Patient ID: Nay Orellana is a 29 y o  female  HPI  Follow up labs - reviewed - vit d low - replacement sent to pharm  Not taking pre natals as she has taken a rest from trying to conceive  URI sxs resolved  Feels good  The following portions of the patient's history were reviewed and updated as appropriate: allergies, past social history, past surgical history and problem list     Review of Systems   Constitutional: Negative  HENT: Negative for congestion, postnasal drip, sinus pain, sinus pressure and sore throat  Respiratory: Negative for cough, shortness of breath and wheezing  Cardiovascular: Negative  Genitourinary: Negative  Neurological: Negative  Objective:      BP 90/60 (BP Location: Left arm, Patient Position: Sitting, Cuff Size: Adult)   Pulse 80   Temp 98 1 °F (36 7 °C) (Oral)   Resp 16   Ht 5' 3" (1 6 m)   Wt 55 5 kg (122 lb 6 4 oz)   SpO2 97%   BMI 21 68 kg/m²          Physical Exam   Constitutional: She is oriented to person, place, and time  She appears well-developed and well-nourished  No distress  HENT:   Head: Normocephalic  Sinuses w/ good transillumination   Eyes: Pupils are equal, round, and reactive to light  Cardiovascular: Normal rate, regular rhythm and normal heart sounds  Pulmonary/Chest: Effort normal and breath sounds normal    Neurological: She is alert and oriented to person, place, and time  Psychiatric: She has a normal mood and affect   Her behavior is normal

## 2019-02-01 ENCOUNTER — OFFICE VISIT (OUTPATIENT)
Dept: NEUROLOGY | Facility: CLINIC | Age: 34
End: 2019-02-01
Payer: COMMERCIAL

## 2019-02-01 VITALS
HEART RATE: 91 BPM | SYSTOLIC BLOOD PRESSURE: 100 MMHG | WEIGHT: 122.5 LBS | DIASTOLIC BLOOD PRESSURE: 64 MMHG | BODY MASS INDEX: 21.71 KG/M2 | HEIGHT: 63 IN

## 2019-02-01 DIAGNOSIS — G40.109 LOCALIZATION-RELATED EPILEPSY (HCC): ICD-10-CM

## 2019-02-01 PROCEDURE — 99213 OFFICE O/P EST LOW 20 MIN: CPT | Performed by: PSYCHIATRY & NEUROLOGY

## 2019-02-01 RX ORDER — LAMOTRIGINE 200 MG/1
200 TABLET ORAL 2 TIMES DAILY
Qty: 180 TABLET | Refills: 3 | Status: SHIPPED | OUTPATIENT
Start: 2019-02-01 | End: 2019-09-05 | Stop reason: SDUPTHER

## 2019-02-01 NOTE — PATIENT INSTRUCTIONS
-- Continue to take lamotrigine 200 mg twice a day and folic acid (at least 1 mg daily)  -- If you have any problems, please give us a call  -- Also, if you would want to try to get pregnant, it may be good for us to recheck a lamotrigine level  Certainly if you would get pregnant, we will need to follow your lamotrigine levels more closely, since this level often falls during pregnancy  If either of these would occur, please give us a call to recheck your medication levels

## 2019-02-01 NOTE — ASSESSMENT & PLAN NOTE
She is fortunately continued to be seizure-free on lamotrigine monotherapy  She is tolerating this well without any side effects  Because she is overall doing well, I will not make any changes to her medications today  --she will continue lamotrigine 200 mg twice a day  She did have a recent lamotrigine level in July which was 8 3, service of baseline if she would have any get pregnant in the future  --I discussed potential birth control options if she would wish to return to using birth control  I would recommend using as low dose of estrogen as is possible  1 consideration would be an intrauterine device such as the Mirena IUD  She will discuss this further with her gyn  --I again discussed pregnancy issues related to epilepsy and the use of lamotrigine  If she would happen to get pregnant, or plan to get pregnant in the future, I asked that she give our office a call in order for us to more closely monitor her medication levels  As previously noted, lamotrigine levels often fall during pregnancy, so we will need to monitor this closely if she would happen to become pregnant

## 2019-02-01 NOTE — PROGRESS NOTES
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, who is returning to Neurology office for follow up of her seizures  Assessment/Plan:    Localization-related epilepsy ([address] )  She is fortunately continued to be seizure-free on lamotrigine monotherapy  She is tolerating this well without any side effects  Because she is overall doing well, I will not make any changes to her medications today  --she will continue lamotrigine 200 mg twice a day  She did have a recent lamotrigine level in July which was 8 3, service of baseline if she would have any get pregnant in the future  --I discussed potential birth control options if she would wish to return to using birth control  I would recommend using as low dose of estrogen as is possible  1 consideration would be an intrauterine device such as the Mirena IUD  She will discuss this further with her gyn  --I again discussed pregnancy issues related to epilepsy and the use of lamotrigine  If she would happen to get pregnant, or plan to get pregnant in the future, I asked that she give our office a call in order for us to more closely monitor her medication levels  As previously noted, lamotrigine levels often fall during pregnancy, so we will need to monitor this closely if she would happen to become pregnant  She will return to the office in about 1 year      Subjective:    HPI  Current seizure medications:  1  Lamotrigine 200 mg twice a day (lamotrigine level 7/26/2018: 8 3)  2  Folic acid 1 mg daily  Other medications as per Epic  I last saw her in the office on 9/26/2018  At that time, she was doing well without any recurrent seizures after increasing her dose of lamotrigine to be 200 mg twice a day  She was tolerating her medications well without side effects, so no changes were made to her medications  She was also already taking folic acid and was considering getting pregnant      Since her last visit, she has continued to be seizure-free  She denies any side effects to her medications  She does continue to take folic acid daily, but reports that she and her  are thinking about waiting a little bit on children, and she may go back on birth control  She otherwise has no issues or problems today  Prior Seizure Medications:  None      The following portions of the patient's history were reviewed and updated as appropriate: allergies, current medications and problem list      Objective:    Blood pressure 100/64, pulse 91, height 5' 3" (1 6 m), weight 55 6 kg (122 lb 8 oz), not currently breastfeeding  Physical Exam    Neurological Exam      ROS:    Review of Systems   Constitutional: Negative  Negative for appetite change and fever  HENT: Negative  Negative for hearing loss, tinnitus, trouble swallowing and voice change  Eyes: Negative  Negative for photophobia and pain  Respiratory: Negative  Negative for shortness of breath  Cardiovascular: Negative  Negative for palpitations  Gastrointestinal: Negative  Negative for nausea and vomiting  Endocrine: Negative  Negative for cold intolerance and heat intolerance  Genitourinary: Negative  Negative for dysuria, frequency and urgency  Musculoskeletal: Negative  Negative for myalgias and neck pain  Skin: Negative  Negative for rash  Neurological: Negative  Negative for dizziness, tremors, seizures, syncope, facial asymmetry, speech difficulty, weakness, light-headedness, numbness and headaches  Hematological: Negative  Does not bruise/bleed easily  Psychiatric/Behavioral: Negative  Negative for confusion, hallucinations and sleep disturbance         I personally reviewed the ROS that was entered by the medical assistant

## 2019-02-18 ENCOUNTER — TELEPHONE (OUTPATIENT)
Dept: OBGYN CLINIC | Facility: CLINIC | Age: 34
End: 2019-02-18

## 2019-02-18 DIAGNOSIS — Z30.011 ENCOUNTER FOR INITIAL PRESCRIPTION OF CONTRACEPTIVE PILLS: Primary | ICD-10-CM

## 2019-02-18 NOTE — TELEPHONE ENCOUNTER
Pt calling re: wanting to start restarting ocps "to take a break" from trying to conceive  She was prev on orthocyclen & Seasonique without problems  She is also under care of Dr Elis Perez (neurology) for seizures, takes Lamictal - he suggested low dose estrogen & is willing to coordinate care if [facility]  Pt's LMP - last wk

## 2019-02-18 NOTE — TELEPHONE ENCOUNTER
Inform pt can start ortho tricyclen-lo x 5 months   Must use back up condoms due to lamictal and low dose

## 2019-02-19 RX ORDER — NORGESTIMATE AND ETHINYL ESTRADIOL 7DAYSX3 LO
1 KIT ORAL DAILY
Qty: 84 TABLET | Refills: 2 | Status: SHIPPED | OUTPATIENT
Start: 2019-02-19 | End: 2019-09-24 | Stop reason: SDUPTHER

## 2019-02-19 NOTE — TELEPHONE ENCOUNTER
Pt informed of KA's recom - she will double up on pills x 2 days then 1 daily & recom consistent barrier birth control  Please sign off on presc to [facility]    Pt will recall office with BP reading in 3 months

## 2019-03-22 ENCOUNTER — OFFICE VISIT (OUTPATIENT)
Dept: FAMILY MEDICINE CLINIC | Facility: CLINIC | Age: 34
End: 2019-03-22
Payer: COMMERCIAL

## 2019-03-22 VITALS
RESPIRATION RATE: 16 BRPM | HEIGHT: 63 IN | DIASTOLIC BLOOD PRESSURE: 70 MMHG | BODY MASS INDEX: 20.8 KG/M2 | SYSTOLIC BLOOD PRESSURE: 100 MMHG | TEMPERATURE: 98.7 F | HEART RATE: 59 BPM | OXYGEN SATURATION: 99 % | WEIGHT: 117.4 LBS

## 2019-03-22 DIAGNOSIS — J06.9 UPPER RESPIRATORY TRACT INFECTION, UNSPECIFIED TYPE: Primary | ICD-10-CM

## 2019-03-22 DIAGNOSIS — J02.9 SORE THROAT: ICD-10-CM

## 2019-03-22 LAB — S PYO AG THROAT QL: NEGATIVE

## 2019-03-22 PROCEDURE — 99213 OFFICE O/P EST LOW 20 MIN: CPT | Performed by: FAMILY MEDICINE

## 2019-03-22 PROCEDURE — 87880 STREP A ASSAY W/OPTIC: CPT | Performed by: FAMILY MEDICINE

## 2019-03-22 RX ORDER — AZITHROMYCIN 250 MG/1
TABLET, FILM COATED ORAL
Qty: 6 TABLET | Refills: 0 | Status: SHIPPED | OUTPATIENT
Start: 2019-03-22 | End: 2019-03-27

## 2019-03-22 RX ORDER — BENZONATATE 200 MG/1
200 CAPSULE ORAL 3 TIMES DAILY PRN
Qty: 30 CAPSULE | Refills: 0 | Status: SHIPPED | OUTPATIENT
Start: 2019-03-22 | End: 2020-05-13 | Stop reason: ALTCHOICE

## 2019-03-22 RX ORDER — METHYLPREDNISOLONE 4 MG/1
TABLET ORAL
Qty: 21 EACH | Refills: 0 | Status: SHIPPED | OUTPATIENT
Start: 2019-03-22 | End: 2020-05-13 | Stop reason: ALTCHOICE

## 2019-03-22 NOTE — PROGRESS NOTES
Assessment/Plan:     Diagnoses and all orders for this visit:    Upper respiratory tract infection, unspecified type  Comments:  Rest and increased fluids advised  Orders:  -     azithromycin (ZITHROMAX) 250 mg tablet; Take 2 tablets PO daily on day #1, then 1 tablet daily on days #2-5  -     methylPREDNISolone 4 MG tablet therapy pack; Use as directed on package  -     benzonatate (TESSALON) 200 MG capsule; Take 1 capsule (200 mg total) by mouth 3 (three) times a day as needed for cough    Sore throat  Comments:  Rapid Strep Negative  Orders:  -     POCT rapid strepA          Subjective:  Patient complain of a sore throat, cough with phlegm, and nasal congestion   Patient says her phlegm is discolored        Patient ID: Alyssa Hillman is a 36 y o  female  Works at [employer]  URI    This is a new problem  The current episode started in the past 7 days  The problem has been gradually worsening  There has been no fever  Associated symptoms include congestion, coughing (colored sputum), a plugged ear sensation, rhinorrhea and a sore throat  Pertinent negatives include no abdominal pain, chest pain, diarrhea, ear pain, nausea, rash, vomiting or wheezing  She has tried increased fluids and sleep for the symptoms  The treatment provided no relief  The following portions of the patient's history were reviewed and updated as appropriate: allergies, current medications, past family history, past medical history, past social history, past surgical history and problem list     Review of Systems   Constitutional: Positive for chills  Negative for fever  HENT: Positive for congestion, postnasal drip, rhinorrhea and sore throat  Negative for ear pain and trouble swallowing  Eyes: Negative  Respiratory: Positive for cough (colored sputum)  Negative for shortness of breath and wheezing  Cardiovascular: Negative  Negative for chest pain     Gastrointestinal: Negative for abdominal pain, diarrhea, nausea and vomiting  Musculoskeletal: Positive for myalgias  Negative for gait problem  Skin: Negative for rash and wound  Objective:      /70 (BP Location: Left arm, Patient Position: Sitting, Cuff Size: Adult)   Pulse 59   Temp 98 7 °F (37 1 °C) (Oral)   Resp 16   Ht 5' 3" (1 6 m)   Wt 53 3 kg (117 lb 6 4 oz)   LMP 03/05/2019   SpO2 99%   BMI 20 80 kg/m²          Physical Exam   Constitutional: She is oriented to person, place, and time  Vital signs are normal  She appears well-developed and well-nourished  She is cooperative  HENT:   Head: Normocephalic and atraumatic  Right Ear: Hearing, tympanic membrane, external ear and ear canal normal    Left Ear: Hearing, tympanic membrane, external ear and ear canal normal    Mouth/Throat: Uvula is midline and mucous membranes are normal  Posterior oropharyngeal erythema present  Eyes: Conjunctivae and lids are normal    Neck: Neck supple  No thyromegaly present  Cardiovascular: Normal rate, regular rhythm and normal heart sounds  Pulmonary/Chest: Effort normal and breath sounds normal    Musculoskeletal: Normal range of motion  Lymphadenopathy:     She has cervical adenopathy (shotty anterior)  Neurological: She is alert and oriented to person, place, and time  Skin: Skin is warm and dry  Psychiatric: She has a normal mood and affect  Her speech is normal and behavior is normal    Nursing note and vitals reviewed

## 2019-03-22 NOTE — PATIENT INSTRUCTIONS

## 2019-04-03 ENCOUNTER — OFFICE VISIT (OUTPATIENT)
Dept: FAMILY MEDICINE CLINIC | Facility: CLINIC | Age: 34
End: 2019-04-03

## 2019-04-03 VITALS
BODY MASS INDEX: 20.6 KG/M2 | HEIGHT: 63 IN | OXYGEN SATURATION: 96 % | SYSTOLIC BLOOD PRESSURE: 120 MMHG | TEMPERATURE: 99.5 F | RESPIRATION RATE: 18 BRPM | HEART RATE: 85 BPM | DIASTOLIC BLOOD PRESSURE: 64 MMHG | WEIGHT: 116.25 LBS

## 2019-04-03 DIAGNOSIS — H10.9 BACTERIAL CONJUNCTIVITIS: Primary | ICD-10-CM

## 2019-04-03 PROCEDURE — 99213 OFFICE O/P EST LOW 20 MIN: CPT | Performed by: NURSE PRACTITIONER

## 2019-04-03 PROCEDURE — 3008F BODY MASS INDEX DOCD: CPT | Performed by: NURSE PRACTITIONER

## 2019-04-03 PROCEDURE — 1036F TOBACCO NON-USER: CPT | Performed by: NURSE PRACTITIONER

## 2019-04-03 RX ORDER — ERYTHROMYCIN 5 MG/G
0.5 OINTMENT OPHTHALMIC EVERY 8 HOURS SCHEDULED
Qty: 3.5 G | Refills: 1 | Status: SHIPPED | OUTPATIENT
Start: 2019-04-03 | End: 2020-05-13 | Stop reason: ALTCHOICE

## 2019-05-01 ENCOUNTER — TELEPHONE (OUTPATIENT)
Dept: OBGYN CLINIC | Facility: CLINIC | Age: 34
End: 2019-05-01

## 2019-09-05 DIAGNOSIS — G40.109 LOCALIZATION-RELATED EPILEPSY (HCC): ICD-10-CM

## 2019-09-05 RX ORDER — LAMOTRIGINE 200 MG/1
200 TABLET ORAL 2 TIMES DAILY
Qty: 180 TABLET | Refills: 1 | Status: SHIPPED | OUTPATIENT
Start: 2019-09-05 | End: 2020-02-14 | Stop reason: SDUPTHER

## 2019-09-24 ENCOUNTER — ANNUAL EXAM (OUTPATIENT)
Dept: OBGYN CLINIC | Facility: CLINIC | Age: 34
End: 2019-09-24
Payer: COMMERCIAL

## 2019-09-24 VITALS
WEIGHT: 121.8 LBS | BODY MASS INDEX: 21.58 KG/M2 | DIASTOLIC BLOOD PRESSURE: 62 MMHG | HEIGHT: 63 IN | SYSTOLIC BLOOD PRESSURE: 114 MMHG

## 2019-09-24 DIAGNOSIS — Z30.011 ENCOUNTER FOR INITIAL PRESCRIPTION OF CONTRACEPTIVE PILLS: ICD-10-CM

## 2019-09-24 DIAGNOSIS — Z01.419 ENCOUNTER FOR ANNUAL ROUTINE GYNECOLOGICAL EXAMINATION: Primary | ICD-10-CM

## 2019-09-24 DIAGNOSIS — Z11.3 SCREEN FOR STD (SEXUALLY TRANSMITTED DISEASE): ICD-10-CM

## 2019-09-24 PROCEDURE — 87591 N.GONORRHOEAE DNA AMP PROB: CPT | Performed by: OBSTETRICS & GYNECOLOGY

## 2019-09-24 PROCEDURE — 99395 PREV VISIT EST AGE 18-39: CPT | Performed by: OBSTETRICS & GYNECOLOGY

## 2019-09-24 PROCEDURE — 87491 CHLMYD TRACH DNA AMP PROBE: CPT | Performed by: OBSTETRICS & GYNECOLOGY

## 2019-09-24 RX ORDER — NORGESTIMATE AND ETHINYL ESTRADIOL 7DAYSX3 LO
1 KIT ORAL DAILY
Qty: 84 TABLET | Refills: 3 | Status: SHIPPED | OUTPATIENT
Start: 2019-09-24 | End: 2020-09-29 | Stop reason: SINTOL

## 2019-09-24 NOTE — PROGRESS NOTES
Assessment/Plan:  Pap smear deferred due to low risk status  Cultures obtained for GC and Chlamydia  Encouraged self-breast examination as well as calcium supplementation  Continue Ortho-Tri-Cyclen Lo  Reviewed the risks and benefits of OCPs with Lamictal   All questions answered  Reviewed breast cancer screening starting at age 36 with mammography  Return to office in 1 year  She will follow up with her neurologist on a regular basis as scheduled  No problem-specific Assessment & Plan notes found for this encounter  Diagnoses and all orders for this visit:    Encounter for annual routine gynecological examination    Encounter for initial prescription of contraceptive pills  -     norgestimate-ethinyl estradiol (ORTHO TRI-CYCLEN LO) 0 18/0 215/0 25 MG-25 MCG per tablet; Take 1 tablet by mouth daily          Subjective:      Patient ID: Dhiraj Garcia is a 36 y o  female  HPI     This is a 71-year-old female G0 who presents for annual gyn exam   In the course of the year she states her  have   Patient has been sexually active over the last 6 months with 1 partner  She states she uses condoms on a regular basis  She restarted birth control pills in January  Her menstrual cycles are regular every 28 days lasting 4-5 days with no breakthrough bleeding  She denies any changes in bowel or bladder function  She did have an E coli urinary tract infection over the year which was successfully treated  Patient continues to follow-up with her neurologist for history of epilepsy  She has been on Lamictal for many years  She had a partial seizure approximately 2 years ago  She does get Lamictal titers done once a year  Her neurologist is aware that she restarted her birth control pill  Last Pap smear 2018 within normal limits  Patient is employed as a  and pediatric neuro development      The following portions of the patient's history were reviewed and updated as appropriate: allergies, current medications, past family history, past medical history, past social history, past surgical history and problem list     Review of Systems   Constitutional: Negative for fatigue, fever and unexpected weight change  Respiratory: Negative for cough, chest tightness, shortness of breath and wheezing  Cardiovascular: Negative  Negative for chest pain and palpitations  Gastrointestinal: Negative  Negative for abdominal distention, abdominal pain, blood in stool, constipation, diarrhea, nausea and vomiting  Genitourinary: Negative  Negative for difficulty urinating, dyspareunia, dysuria, flank pain, frequency, genital sores, hematuria, pelvic pain, urgency, vaginal bleeding, vaginal discharge and vaginal pain  Skin: Negative for rash  Objective:      /62   Ht 5' 3" (1 6 m)   Wt 55 2 kg (121 lb 12 8 oz)   LMP 09/16/2019 (Exact Date)   Breastfeeding? No   BMI 21 58 kg/m²          Physical Exam   Constitutional: She appears well-developed and well-nourished  Cardiovascular: Normal rate and regular rhythm  Pulmonary/Chest: Effort normal and breath sounds normal  Right breast exhibits no inverted nipple, no mass, no nipple discharge, no skin change and no tenderness  Left breast exhibits no inverted nipple, no mass, no nipple discharge, no skin change and no tenderness  Abdominal: Soft  Bowel sounds are normal  She exhibits no distension  There is no tenderness  There is no rebound and no guarding  Genitourinary: Vagina normal and uterus normal  There is no lesion on the right labia  There is no lesion on the left labia  Cervix exhibits no discharge and no friability  Right adnexum displays no mass, no tenderness and no fullness  Left adnexum displays no mass, no tenderness and no fullness  No vaginal discharge found

## 2019-09-27 LAB
C TRACH DNA SPEC QL NAA+PROBE: NEGATIVE
N GONORRHOEA DNA SPEC QL NAA+PROBE: NEGATIVE

## 2020-02-14 ENCOUNTER — OFFICE VISIT (OUTPATIENT)
Dept: NEUROLOGY | Facility: CLINIC | Age: 35
End: 2020-02-14
Payer: COMMERCIAL

## 2020-02-14 VITALS
DIASTOLIC BLOOD PRESSURE: 70 MMHG | BODY MASS INDEX: 21.69 KG/M2 | HEIGHT: 63 IN | WEIGHT: 122.4 LBS | HEART RATE: 74 BPM | SYSTOLIC BLOOD PRESSURE: 112 MMHG

## 2020-02-14 DIAGNOSIS — G40.109 LOCALIZATION-RELATED EPILEPSY (HCC): Primary | ICD-10-CM

## 2020-02-14 PROCEDURE — 99214 OFFICE O/P EST MOD 30 MIN: CPT | Performed by: PSYCHIATRY & NEUROLOGY

## 2020-02-14 PROCEDURE — 1036F TOBACCO NON-USER: CPT | Performed by: PSYCHIATRY & NEUROLOGY

## 2020-02-14 RX ORDER — LAMOTRIGINE 200 MG/1
200 TABLET ORAL 2 TIMES DAILY
Qty: 180 TABLET | Refills: 3 | Status: SHIPPED | OUTPATIENT
Start: 2020-02-14 | End: 2021-02-12 | Stop reason: SDUPTHER

## 2020-02-14 NOTE — PROGRESS NOTES
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, who is returning to Neurology office for follow up of her seizures         Assessment/Plan:       Patient is to remain on Lamotrigine 200mg BID  She is tolerating the medication well, denies side effects or missed doses  Discussed option of stopping medications as patient has been seizure free for 3 years  She admits that her and her  may want to have children in the future  Discussed risks of stopping medication and of epilepsy in pregnancy  Advised that if she does want to try to stop medication it should be done prior to getting pregnant  She is agreeable to calling the office if she decides she would like to come off of her medication or if she becomes pregnant       I spent a total of 25 min with the patient with greater than 50% of that time spent counseling and coordinating her care, specifically discussing her diagnosis, medications, possibility of coming off medications and pregnancy issues, as detailed above       She will return to the office in about 1 year          Subjective:     HPI  Current seizure medications:  1  Lamotrigine 200 mg PO BID (Lamotrigine level July 2018 was 8 3)  2  Folic acid 1mg PO daily  Other medications as per Epic      She was last seen in the office on 02/01/2019  At that time, she was seizure free and Lamotrigine was continued at 200mg PO BID       Since her last visit she has remained seizure free  States it has been over 10 years since last "grand mal seizure" and 3 years since last "petit mal" seizure  Tolerating medication well, no side effects, and does not miss doses  She is taking folic acid 1mg daily and has no plans at this time for pregnancy- she is taking Ortho tri-cyclen lo  She is driving and works in an office   Denies any current concerns or complaints at today's visit        Prior Seizure Medications: None          *** I personally reviewed her ***MRI from ***, as detailed below  *** I reviewed ***, as documented in Epic/Kamida, and summarized above       {Common ambulatory SmartLinks:18969}     Objective:     Blood pressure 112/70, pulse 74, height 5' 3" (1 6 m), weight 55 5 kg (122 lb 6 4 oz), not currently breastfeeding      Physical Exam  Cardiac- s1,s2, regular rate and rhythm  Lungs- CTA, chest expansion symmetrical  Fundi- normal       Neurologic Exam     Mental Status   Oriented to person, place, and time  Attention: normal  Concentration: normal    Speech: speech is normal   Level of consciousness: alert    Cranial Nerves     CN II   Visual fields full to confrontation  CN III, IV, VI   Pupils are equal, round, and reactive to light  Extraocular motions are normal      CN V   Right facial sensation deficit: none  Left facial sensation deficit: none    CN VII   Facial expression full, symmetric  Right facial weakness: none  Left facial weakness: none    CN VIII   Hearing: intact    CN IX, X   Palate: symmetric    CN XI   Right sternocleidomastoid strength: normal  Left sternocleidomastoid strength: normal  Right trapezius strength: normal  Left trapezius strength: normal    CN XII   Tongue: not atrophic  Fasciculations: absent  Tongue deviation: none    Motor Exam   Muscle bulk: normal  Overall muscle tone: normal    Strength   Strength 5/5 throughout  Sensory Exam   Light touch normal      Gait, Coordination, and Reflexes     Gait  Gait: normal    Coordination   Romberg: negative  Finger to nose coordination: normal    Tremor   Resting tremor: absent  Intention tremor: absent  Action tremor: absent    Reflexes   Right brachioradialis: 2+  Left brachioradialis: 2+  Right biceps: 2+  Left biceps: 2+  Right patellar: 2+  Left patellar: 2+         ROS:     Review of Systems   Constitutional: Negative  Negative for appetite change and fever  HENT: Negative  Negative for hearing loss, tinnitus, trouble swallowing and voice change      Eyes: Negative  Negative for photophobia and pain  Respiratory: Negative  Negative for shortness of breath  Cardiovascular: Negative  Negative for palpitations and chest pain  Gastrointestinal: Negative  Negative for nausea and vomiting  Endocrine: Negative  Negative for cold intolerance and heat intolerance  Genitourinary: Negative  Negative for dysuria, frequency and urgency  Musculoskeletal: Negative  Negative for myalgias and neck pain  Skin: Negative  Negative for rash  Neurological: Negative  Negative for dizziness, tremors, seizures, syncope, facial asymmetry, speech difficulty, weakness, light-headedness, numbness/tingling, and headaches  Hematological: Negative  Does not bruise/bleed easily  Psychiatric/Behavioral: Negative    Negative for confusion, hallucinations and sleep disturbance          I personally reviewed the ROS that was entered by the medical assistant

## 2020-02-14 NOTE — PROGRESS NOTES
Patient ID: Janae Mccray is a 36 y o  female with localization related epilepsy, who is returning to Neurology office for follow up of her seizures  Assessment/Plan:    Localization-related epilepsy [facility]  Patient is to remain on Lamotrigine 200mg BID  She is tolerating the medication well, denies side effects or missed doses       Discussed option of stopping medications as patient has been seizure free for 3 years  She admits that her and her  may want to have children in the future  Discussed risks of stopping medication and of epilepsy in pregnancy  Advised that if she does want to try to stop medication it should be done prior to getting pregnant  She is agreeable to calling the office if she decides she would like to come off of her medication or if she becomes pregnant  -- if she would want to try to come off of medications, will be important for us to get a routine EEG to help assess this risk  If she would have epileptiform discharges, then coming off of medications would be too risky  I did discuss that since she has seizures within the last 30 years and has had epilepsy for a very long period of time, it is certainly possible that she may have additional seizures if we wean off of medications  I spent a total of 25 min with the patient with greater than 50% of that time spent counseling and coordinating her care, specifically discussing her diagnosis, medication, pregnancy issues related to epilepsy, and plan, as detailed above     She will return to the office in about 12 months  Subjective:    HPI  Current seizure medications:  1  Lamotrigine 200 mg twice aday  Other medications as per Epic  I last saw her in the office on 02/01/2019  At that time, she was seizure free and without side effects, so Lamotrigine was continued at 200mg PO twice a day       Since her last visit she has remained seizure free   States it has been over 10 years since last "grand mal seizure" and 3 years since last "petit mal" seizure  Tolerating medication well, no side effects, and does not miss doses  She is taking folic acid 1mg daily and has no plans at this time for pregnancy- she is taking Ortho tri-cyclen lo  She is driving and works in an office  Denies any current concerns or complaints at today's visit  Prior Seizure Medications: none    The following portions of the patient's history were reviewed and updated as appropriate: allergies, current medications, past medical history and problem list      Objective:    Blood pressure 112/70, pulse 74, height 5' 3" (1 6 m), weight 55 5 kg (122 lb 6 4 oz), not currently breastfeeding  Physical Exam    Neurological Exam      ROS:    Review of Systems   Constitutional: Negative  Negative for appetite change and fever  HENT: Negative  Negative for hearing loss, tinnitus, trouble swallowing and voice change  Eyes: Negative  Negative for photophobia and pain  Respiratory: Negative  Negative for shortness of breath  Cardiovascular: Negative  Negative for palpitations  Gastrointestinal: Negative  Negative for nausea and vomiting  Endocrine: Negative  Negative for cold intolerance and heat intolerance  Genitourinary: Negative  Negative for dysuria, frequency and urgency  Musculoskeletal: Negative  Negative for myalgias and neck pain  Skin: Negative  Negative for rash  Neurological: Negative  Negative for dizziness, tremors, seizures, syncope, facial asymmetry, speech difficulty, weakness, light-headedness, numbness and headaches  Hematological: Negative  Does not bruise/bleed easily  Psychiatric/Behavioral: Negative  Negative for confusion, hallucinations and sleep disturbance         I personally reviewed the ROS that was entered by the medical assistant

## 2020-02-14 NOTE — ASSESSMENT & PLAN NOTE
Patient is to remain on Lamotrigine 200mg BID  She is tolerating the medication well, denies side effects or missed doses       Discussed option of stopping medications as patient has been seizure free for 3 years  She admits that her and her  may want to have children in the future  Discussed risks of stopping medication and of epilepsy in pregnancy  Advised that if she does want to try to stop medication it should be done prior to getting pregnant  She is agreeable to calling the office if she decides she would like to come off of her medication or if she becomes pregnant  -- if she would want to try to come off of medications, will be important for us to get a routine EEG to help assess this risk  If she would have epileptiform discharges, then coming off of medications would be too risky  I did discuss that since she has seizures within the last 30 years and has had epilepsy for a very long period of time, it is certainly possible that she may have additional seizures if we wean off of medications

## 2020-03-06 DIAGNOSIS — G40.109 LOCALIZATION-RELATED EPILEPSY (HCC): Primary | ICD-10-CM

## 2020-03-25 ENCOUNTER — TELEPHONE (OUTPATIENT)
Dept: NEUROLOGY | Facility: CLINIC | Age: 35
End: 2020-03-25

## 2020-03-25 ENCOUNTER — HOSPITAL ENCOUNTER (OUTPATIENT)
Dept: NEUROLOGY | Facility: CLINIC | Age: 35
Discharge: HOME | End: 2020-03-25
Payer: COMMERCIAL

## 2020-03-25 DIAGNOSIS — G40.109 LOCALIZATION-RELATED EPILEPSY (HCC): ICD-10-CM

## 2020-03-25 PROCEDURE — 95816 EEG AWAKE AND DROWSY: CPT

## 2020-03-25 PROCEDURE — 95816 EEG AWAKE AND DROWSY: CPT | Performed by: PSYCHIATRY & NEUROLOGY

## 2020-03-25 NOTE — TELEPHONE ENCOUNTER
Called and advised pt of all of the below  She verbalized clear understanding of all instructions  Pt states that she is unsure if she wants to decrease her meds  She will think about it and call us back w/ her decision          ----- Message from Taylor Garay MD sent at 3/25/2020 11:59 AM EDT -----  Please call patient about result of her recent EEG  This was normal and would suggest that she would be at a lower risk of having an additional seizure if we tried to come down on her medications  If she still would like to come down on her medications, I would recommend that she avoid driving while decreasing her medications and after it has been stopped for at least a month  If she has any additional events or odd spells, she should give our office a call to get the medication restarted  If she would have a seizure, depending how far down she is on her medications, the seizure would likely impact her ability to drive, possibly for up to a few months while we would restart the medication (since lamotrigine may need to be slowly titrated back up)

## 2020-05-13 ENCOUNTER — TELEMEDICINE (OUTPATIENT)
Dept: FAMILY MEDICINE CLINIC | Facility: CLINIC | Age: 35
End: 2020-05-13
Payer: COMMERCIAL

## 2020-05-13 VITALS — HEIGHT: 63 IN | BODY MASS INDEX: 21.62 KG/M2 | WEIGHT: 122 LBS

## 2020-05-13 DIAGNOSIS — Z20.828 EXPOSURE TO SARS-ASSOCIATED CORONAVIRUS: ICD-10-CM

## 2020-05-13 DIAGNOSIS — Z20.828 EXPOSURE TO SARS-ASSOCIATED CORONAVIRUS: Primary | ICD-10-CM

## 2020-05-13 PROCEDURE — U0003 INFECTIOUS AGENT DETECTION BY NUCLEIC ACID (DNA OR RNA); SEVERE ACUTE RESPIRATORY SYNDROME CORONAVIRUS 2 (SARS-COV-2) (CORONAVIRUS DISEASE [COVID-19]), AMPLIFIED PROBE TECHNIQUE, MAKING USE OF HIGH THROUGHPUT TECHNOLOGIES AS DESCRIBED BY CMS-2020-01-R: HCPCS

## 2020-05-13 PROCEDURE — 99214 OFFICE O/P EST MOD 30 MIN: CPT | Performed by: FAMILY MEDICINE

## 2020-05-14 LAB — SARS-COV-2 RNA RESP QL NAA+PROBE: NEGATIVE

## 2020-09-09 DIAGNOSIS — Z30.41 ENCOUNTER FOR SURVEILLANCE OF CONTRACEPTIVE PILLS: Primary | ICD-10-CM

## 2020-09-09 RX ORDER — NORETHINDRONE ACETATE AND ETHINYL ESTRADIOL 1MG-20(21)
1 KIT ORAL DAILY
Qty: 84 TABLET | Refills: 0 | Status: SHIPPED | OUTPATIENT
Start: 2020-09-09 | End: 2020-09-29 | Stop reason: SDUPTHER

## 2020-09-09 NOTE — TELEPHONE ENCOUNTER
Has yearly appt sched for 9/29/2020 - needs rf Tri Lo Sprintec but is inquiring about ocp change also  She is having longer duration with menses (7 days), not heavy flow, just prolonged  Also skin discoloration on forehead & cheeks which seems to be worsening - MD she works with mentioned may be melasma & poss associated with ocps  She is due to start new pill pack on 9/13/2020 - do you recommend same ocp until appt or ocp change?

## 2020-09-09 NOTE — TELEPHONE ENCOUNTER
pt informed can change to Loestrin FE 1/20 - please sign off on presc to [facility])

## 2020-09-29 ENCOUNTER — ANNUAL EXAM (OUTPATIENT)
Dept: OBGYN CLINIC | Facility: CLINIC | Age: 35
End: 2020-09-29
Payer: COMMERCIAL

## 2020-09-29 VITALS
WEIGHT: 124 LBS | BODY MASS INDEX: 21.97 KG/M2 | TEMPERATURE: 97 F | DIASTOLIC BLOOD PRESSURE: 62 MMHG | HEIGHT: 63 IN | SYSTOLIC BLOOD PRESSURE: 110 MMHG

## 2020-09-29 DIAGNOSIS — Z30.41 ENCOUNTER FOR SURVEILLANCE OF CONTRACEPTIVE PILLS: ICD-10-CM

## 2020-09-29 DIAGNOSIS — Z01.419 ENCOUNTER FOR ANNUAL ROUTINE GYNECOLOGICAL EXAMINATION: Primary | ICD-10-CM

## 2020-09-29 PROCEDURE — 87624 HPV HI-RISK TYP POOLED RSLT: CPT | Performed by: OBSTETRICS & GYNECOLOGY

## 2020-09-29 PROCEDURE — 99395 PREV VISIT EST AGE 18-39: CPT | Performed by: OBSTETRICS & GYNECOLOGY

## 2020-09-29 PROCEDURE — G0145 SCR C/V CYTO,THINLAYER,RESCR: HCPCS | Performed by: OBSTETRICS & GYNECOLOGY

## 2020-09-29 RX ORDER — NORETHINDRONE ACETATE AND ETHINYL ESTRADIOL 1MG-20(21)
1 KIT ORAL DAILY
Qty: 84 TABLET | Refills: 3 | Status: SHIPPED | OUTPATIENT
Start: 2020-09-29 | End: 2021-09-21 | Stop reason: SDUPTHER

## 2020-09-29 NOTE — PROGRESS NOTES
Assessment/Plan:  Pap smear done for cytology, reflex HPV  Encouraged self-breast examination as well as calcium supplementation  She will continue Loestrin 1/20, keep a menstrual diary over the next 2-3 months and call with update  She will continue to follow-up with her neurologist as scheduled  Return to office in 1 year or p r n  No problem-specific Assessment & Plan notes found for this encounter  Diagnoses and all orders for this visit:    Encounter for annual routine gynecological examination    Encounter for surveillance of contraceptive pills  -     norethindrone-ethinyl estradiol (JUNEL FE 1/20) 1-20 MG-MCG per tablet; Take 1 tablet by mouth daily          Subjective:      Patient ID: Esther Rose is a 30 y o  female  HPI     This is a pleasant 71-year-old female G0 who presents for annual well gyn exam   She offers no complaints today  She was on Ortho-Tri-Cyclen Lo for approximately a year and half and has recently changed to Loestrin 1/20 due to prolonged light menstrual cycle as well as possible third facial skin color changes  Patient is sexually active and has been in a monogamous relationship for the last year and half  She denies any changes in bowel or bladder function  She does follow up with her neurologist, history of epilepsy, last grand mal seizure over 10 years ago, last partial seizure 3 years ago  The following portions of the patient's history were reviewed and updated as appropriate: allergies, current medications, past family history, past medical history, past social history, past surgical history and problem list     Review of Systems   Constitutional: Negative for fatigue, fever and unexpected weight change  Respiratory: Negative for cough, chest tightness, shortness of breath and wheezing  Cardiovascular: Negative  Negative for chest pain and palpitations  Gastrointestinal: Negative    Negative for abdominal distention, abdominal pain, blood in stool, constipation, diarrhea, nausea and vomiting  Genitourinary: Negative  Negative for difficulty urinating, dyspareunia, dysuria, flank pain, frequency, genital sores, hematuria, pelvic pain, urgency, vaginal bleeding, vaginal discharge and vaginal pain  Skin: Negative for rash  Objective:      /62   Temp (!) 97 °F (36 1 °C)   Ht 5' 3" (1 6 m)   Wt 56 2 kg (124 lb)   BMI 21 97 kg/m²          Physical Exam  Constitutional:       Appearance: Normal appearance  She is well-developed  Cardiovascular:      Rate and Rhythm: Normal rate and regular rhythm  Pulmonary:      Effort: Pulmonary effort is normal       Breath sounds: Normal breath sounds  Chest:      Breasts:         Right: No inverted nipple, mass, nipple discharge, skin change or tenderness  Left: No inverted nipple, mass, nipple discharge, skin change or tenderness  Abdominal:      General: Bowel sounds are normal  There is no distension  Palpations: Abdomen is soft  Tenderness: There is no abdominal tenderness  There is no guarding or rebound  Genitourinary:     Labia:         Right: No rash, tenderness or lesion  Left: No rash, tenderness or lesion  Vagina: Normal  No vaginal discharge  Cervix: No cervical motion tenderness, discharge, friability, lesion, erythema or cervical bleeding  Uterus: Normal        Adnexa:         Right: No mass, tenderness or fullness  Left: No mass, tenderness or fullness  Comments: External genitalia is within normal limits  The vagina is well estrogenized  Cervix is small nulliparous  There is no pelvic floor prolapse  Neurological:      Mental Status: She is alert and oriented to person, place, and time

## 2020-10-05 ENCOUNTER — TELEPHONE (OUTPATIENT)
Dept: OBGYN CLINIC | Facility: CLINIC | Age: 35
End: 2020-10-05

## 2020-10-05 LAB
HPV HR 12 DNA CVX QL NAA+PROBE: NEGATIVE
HPV16 DNA CVX QL NAA+PROBE: NEGATIVE
HPV18 DNA CVX QL NAA+PROBE: NEGATIVE

## 2020-10-06 LAB
LAB AP GYN PRIMARY INTERPRETATION: NORMAL
Lab: NORMAL

## 2020-11-27 ENCOUNTER — TELEPHONE (OUTPATIENT)
Dept: OBGYN CLINIC | Facility: CLINIC | Age: 35
End: 2020-11-27

## 2020-12-23 ENCOUNTER — IMMUNIZATIONS (OUTPATIENT)
Dept: FAMILY MEDICINE CLINIC | Facility: HOSPITAL | Age: 35
End: 2020-12-23
Payer: COMMERCIAL

## 2020-12-23 DIAGNOSIS — Z23 ENCOUNTER FOR IMMUNIZATION: ICD-10-CM

## 2020-12-23 PROCEDURE — 0011A SARS-COV-2 / COVID-19 MRNA VACCINE (MODERNA) 100 MCG: CPT

## 2020-12-23 PROCEDURE — 91301 SARS-COV-2 / COVID-19 MRNA VACCINE (MODERNA) 100 MCG: CPT

## 2021-01-21 ENCOUNTER — IMMUNIZATIONS (OUTPATIENT)
Dept: FAMILY MEDICINE CLINIC | Facility: HOSPITAL | Age: 36
End: 2021-01-21

## 2021-01-21 DIAGNOSIS — Z23 ENCOUNTER FOR IMMUNIZATION: Primary | ICD-10-CM

## 2021-01-21 PROCEDURE — 0012A SARS-COV-2 / COVID-19 MRNA VACCINE (MODERNA) 100 MCG: CPT

## 2021-01-21 PROCEDURE — 91301 SARS-COV-2 / COVID-19 MRNA VACCINE (MODERNA) 100 MCG: CPT

## 2021-02-12 ENCOUNTER — OFFICE VISIT (OUTPATIENT)
Dept: NEUROLOGY | Facility: CLINIC | Age: 36
End: 2021-02-12
Payer: COMMERCIAL

## 2021-02-12 VITALS
WEIGHT: 124 LBS | SYSTOLIC BLOOD PRESSURE: 100 MMHG | HEART RATE: 67 BPM | BODY MASS INDEX: 21.97 KG/M2 | DIASTOLIC BLOOD PRESSURE: 60 MMHG | HEIGHT: 63 IN

## 2021-02-12 DIAGNOSIS — G40.109 LOCALIZATION-RELATED EPILEPSY (HCC): ICD-10-CM

## 2021-02-12 DIAGNOSIS — E55.9 VITAMIN D DEFICIENCY: Primary | ICD-10-CM

## 2021-02-12 PROCEDURE — 99214 OFFICE O/P EST MOD 30 MIN: CPT | Performed by: PSYCHIATRY & NEUROLOGY

## 2021-02-12 RX ORDER — LAMOTRIGINE 200 MG/1
200 TABLET ORAL 2 TIMES DAILY
Qty: 180 TABLET | Refills: 3 | Status: SHIPPED | OUTPATIENT
Start: 2021-02-12 | End: 2022-04-19 | Stop reason: SDUPTHER

## 2021-02-12 NOTE — ASSESSMENT & PLAN NOTE
She has not had any additional seizures since her last appointment  She is tolerating her medications well without any side effects  She is not interested in changing her medicines today, since she does not want to risk having another seizure and having this potentially impact her ability to drive  At this point, because she is doing well, and lamotrigine is low risk medication, I do not have any objections to her continuing on medicines going forward  --she will continue lamotrigine 200 mg twice a day  -- I will check a lamotrigine level to help establish a baseline level if she would plan on getting pregnant in the future  Her last lamotrigine level was 8 3 on 7/26/2018  We did discussed pregnancy issues related to lamotrigine and that if she would get pregnant, she needs to be in touch with our office so that we can monitor her levels and possibly adjust her dose of lamotrigine

## 2021-02-12 NOTE — PROGRESS NOTES
Patient ID: ZAKI CATHERINE is a 30 y o  female with localization related epilepsy, who is returning to Neurology office for follow up of her seizures  Assessment/Plan:    Localization-related epilepsy [facility])    She has not had any additional seizures since her last appointment  She is tolerating her medications well without any side effects  She is not interested in changing her medicines today, since she does not want to risk having another seizure and having this potentially impact her ability to drive  At this point, because she is doing well, and lamotrigine is low risk medication, I do not have any objections to her continuing on medicines going forward  --she will continue lamotrigine 200 mg twice a day  -- I will check a lamotrigine level to help establish a baseline level if she would plan on getting pregnant in the future  Her last lamotrigine level was 8 3 on 7/26/2018  We did discussed pregnancy issues related to lamotrigine and that if she would get pregnant, she needs to be in touch with our office so that we can monitor her levels and possibly adjust her dose of lamotrigine  Vitamin D deficiency    Vitamin-D was quite low being 17 on 12/12/2018  This has not been we checked since although she did take vitamin-D supplementation in the past   She is only taking multivitamin now  I will recheck her vitamin-D level today when she gets her lamotrigine level checked in order to assure that this is not low still  She will Return in about 1 year (around 2/12/2022)  Subjective:    HPI  Current seizure medications:  1  Lamotrigine 200 mg twice a day  2  Folic acid 1 mg daily  Other medications as per Epic  Since her last visit, She has not had any seizures and is tolerating lamotrigine well without any side effects  She does not miss her doses and overall has been doing quite well    It has been many years since her last seizure, but she is not interested in changing her medications today  She is potentially planning to get pregnant at some point in the future  She does not have a clear time frame, but is planning on keeping our office in the loop  She has continued to take folic acid 1 mg daily  Prior Seizure Medications: none       Objective:    Blood pressure 100/60, pulse 67, height 5' 3" (1 6 m), weight 56 2 kg (124 lb), not currently breastfeeding  Physical Exam    Neurological Exam      ROS:    Review of Systems   Constitutional: Negative  Negative for appetite change and fever  HENT: Negative  Negative for hearing loss, tinnitus, trouble swallowing and voice change  Eyes: Negative  Negative for photophobia and pain  Respiratory: Negative  Negative for shortness of breath  Cardiovascular: Negative  Negative for palpitations  Gastrointestinal: Negative  Negative for nausea and vomiting  Endocrine: Negative  Negative for cold intolerance  Genitourinary: Negative  Negative for dysuria, frequency and urgency  Musculoskeletal: Negative  Negative for myalgias and neck pain  Skin: Negative  Negative for rash  Neurological: Positive for seizures  Negative for dizziness, tremors, syncope, facial asymmetry, speech difficulty, weakness, light-headedness, numbness and headaches  Patient stated that her last seizure was more than 10 years ago  Hematological: Negative  Does not bruise/bleed easily  Psychiatric/Behavioral: Negative  Negative for confusion, hallucinations and sleep disturbance         I personally reviewed the ROS that was entered by the medical assistant

## 2021-02-12 NOTE — ASSESSMENT & PLAN NOTE
Vitamin-D was quite low being 17 on 12/12/2018  This has not been we checked since although she did take vitamin-D supplementation in the past   She is only taking multivitamin now  I will recheck her vitamin-D level today when she gets her lamotrigine level checked in order to assure that this is not low still

## 2021-02-12 NOTE — PATIENT INSTRUCTIONS
-- Continue to take Lamotrigine unchanged  -- Please get a lamotrigine level and a vitamin D level checked when you are able

## 2021-04-30 ENCOUNTER — APPOINTMENT (OUTPATIENT)
Dept: LAB | Facility: HOSPITAL | Age: 36
End: 2021-04-30
Payer: COMMERCIAL

## 2021-04-30 ENCOUNTER — TRANSCRIBE ORDERS (OUTPATIENT)
Dept: LAB | Facility: HOSPITAL | Age: 36
End: 2021-04-30

## 2021-04-30 ENCOUNTER — APPOINTMENT (OUTPATIENT)
Dept: LAB | Facility: HOSPITAL | Age: 36
End: 2021-04-30
Attending: PSYCHIATRY & NEUROLOGY
Payer: COMMERCIAL

## 2021-04-30 DIAGNOSIS — E55.9 VITAMIN D DEFICIENCY: ICD-10-CM

## 2021-04-30 DIAGNOSIS — G40.109 LOCALIZATION-RELATED EPILEPSY (HCC): ICD-10-CM

## 2021-04-30 DIAGNOSIS — Z00.8 HEALTH EXAMINATION IN POPULATION SURVEYS: ICD-10-CM

## 2021-04-30 DIAGNOSIS — Z00.8 HEALTH EXAMINATION IN POPULATION SURVEYS: Primary | ICD-10-CM

## 2021-04-30 LAB
25(OH)D3 SERPL-MCNC: 53.4 NG/ML (ref 30–100)
CHOLEST SERPL-MCNC: 239 MG/DL (ref 50–200)
EST. AVERAGE GLUCOSE BLD GHB EST-MCNC: 94 MG/DL
HBA1C MFR BLD: 4.9 %
HDLC SERPL-MCNC: 73 MG/DL
LDLC SERPL CALC-MCNC: 153 MG/DL (ref 0–100)
NONHDLC SERPL-MCNC: 166 MG/DL
TRIGL SERPL-MCNC: 67 MG/DL

## 2021-04-30 PROCEDURE — 82306 VITAMIN D 25 HYDROXY: CPT

## 2021-04-30 PROCEDURE — 83036 HEMOGLOBIN GLYCOSYLATED A1C: CPT

## 2021-04-30 PROCEDURE — 80061 LIPID PANEL: CPT

## 2021-04-30 PROCEDURE — 80175 DRUG SCREEN QUAN LAMOTRIGINE: CPT

## 2021-04-30 PROCEDURE — 36415 COLL VENOUS BLD VENIPUNCTURE: CPT

## 2021-05-03 LAB — LAMOTRIGINE SERPL-MCNC: 10.5 UG/ML (ref 2–20)

## 2021-09-21 ENCOUNTER — TELEPHONE (OUTPATIENT)
Dept: OBGYN CLINIC | Facility: CLINIC | Age: 36
End: 2021-09-21

## 2021-09-21 DIAGNOSIS — Z30.41 ENCOUNTER FOR SURVEILLANCE OF CONTRACEPTIVE PILLS: ICD-10-CM

## 2021-09-21 RX ORDER — NORETHINDRONE ACETATE AND ETHINYL ESTRADIOL 1MG-20(21)
1 KIT ORAL DAILY
Qty: 84 TABLET | Refills: 0 | Status: SHIPPED | OUTPATIENT
Start: 2021-09-21 | End: 2021-11-09 | Stop reason: SDUPTHER

## 2021-09-21 NOTE — TELEPHONE ENCOUNTER
Pt sent e-mail message yanique son Junel FE 1/20 - has yearly scheduled for 11/9/2021 - she would like to rf prior to leaving for vacation  Please sign off on presc for same to [facility]

## 2021-11-09 ENCOUNTER — ANNUAL EXAM (OUTPATIENT)
Dept: OBGYN CLINIC | Facility: CLINIC | Age: 36
End: 2021-11-09
Payer: COMMERCIAL

## 2021-11-09 VITALS
WEIGHT: 115 LBS | HEIGHT: 63 IN | BODY MASS INDEX: 20.38 KG/M2 | SYSTOLIC BLOOD PRESSURE: 110 MMHG | DIASTOLIC BLOOD PRESSURE: 70 MMHG

## 2021-11-09 DIAGNOSIS — Z30.41 ENCOUNTER FOR SURVEILLANCE OF CONTRACEPTIVE PILLS: ICD-10-CM

## 2021-11-09 DIAGNOSIS — E78.00 HYPERCHOLESTEROLEMIA: ICD-10-CM

## 2021-11-09 DIAGNOSIS — Z01.419 ENCOUNTER FOR ANNUAL ROUTINE GYNECOLOGICAL EXAMINATION: Primary | ICD-10-CM

## 2021-11-09 DIAGNOSIS — G40.109 LOCALIZATION-RELATED EPILEPSY (HCC): ICD-10-CM

## 2021-11-09 PROCEDURE — 99395 PREV VISIT EST AGE 18-39: CPT | Performed by: OBSTETRICS & GYNECOLOGY

## 2021-11-09 RX ORDER — NORETHINDRONE ACETATE AND ETHINYL ESTRADIOL 1MG-20(21)
1 KIT ORAL DAILY
Qty: 84 TABLET | Refills: 3 | Status: SHIPPED | OUTPATIENT
Start: 2021-11-09 | End: 2022-01-18 | Stop reason: SDUPTHER

## 2022-01-18 DIAGNOSIS — Z30.41 ENCOUNTER FOR SURVEILLANCE OF CONTRACEPTIVE PILLS: ICD-10-CM

## 2022-01-18 RX ORDER — NORETHINDRONE ACETATE AND ETHINYL ESTRADIOL 1MG-20(21)
1 KIT ORAL DAILY
Qty: 84 TABLET | Refills: 3 | Status: SHIPPED | OUTPATIENT
Start: 2022-01-18

## 2022-01-18 NOTE — TELEPHONE ENCOUNTER
Patient called pharm and they stated they do not have refill request for oc    Please sign new script

## 2022-04-19 ENCOUNTER — OFFICE VISIT (OUTPATIENT)
Dept: NEUROLOGY | Facility: CLINIC | Age: 37
End: 2022-04-19
Payer: COMMERCIAL

## 2022-04-19 VITALS
HEIGHT: 63 IN | WEIGHT: 115 LBS | DIASTOLIC BLOOD PRESSURE: 70 MMHG | BODY MASS INDEX: 20.38 KG/M2 | SYSTOLIC BLOOD PRESSURE: 110 MMHG

## 2022-04-19 DIAGNOSIS — G40.109 LOCALIZATION-RELATED EPILEPSY (HCC): ICD-10-CM

## 2022-04-19 PROCEDURE — 99214 OFFICE O/P EST MOD 30 MIN: CPT | Performed by: PSYCHIATRY & NEUROLOGY

## 2022-04-19 RX ORDER — LAMOTRIGINE 200 MG/1
200 TABLET ORAL 2 TIMES DAILY
Qty: 180 TABLET | Refills: 3 | Status: SHIPPED | OUTPATIENT
Start: 2022-04-19

## 2022-04-19 NOTE — ASSESSMENT & PLAN NOTE
She has continued to be seizure free with Lamotrigine monotherapy  She continues to prefer to stay on medications unchanged going forward to avoid any additional seizures  -- Because she is doing well, she will continue Lamotrigine 200 mg twice a day  If she would have additional seizures, her dose could be increased  -- she is planning on getting pregnant in the next year  We discussed pregnancy issues related to her seizures and medications  I will have her continue folic acid 1 mg daily and  Recheck a lamotrigine level  If she would get pregnant, she will be in touch with our office for a sooner follow up appointment and to check her lamotrigine levels more often while she would be pregnant

## 2022-04-19 NOTE — PROGRESS NOTES
Patient ID: Virgilio Guzman is a 40 y o  female with localization related epilepsy, who is returning to Neurology office for follow up of her seizures  Assessment/Plan:    Localization-related epilepsy [facility])  She has continued to be seizure free with Lamotrigine monotherapy  She continues to prefer to stay on medications unchanged going forward to avoid any additional seizures  -- Because she is doing well, she will continue Lamotrigine 200 mg twice a day  If she would have additional seizures, her dose could be increased  -- she is planning on getting pregnant in the next year  We discussed pregnancy issues related to her seizures and medications  I will have her continue folic acid 1 mg daily and  Recheck a lamotrigine level  If she would get pregnant, she will be in touch with our office for a sooner follow up appointment and to check her lamotrigine levels more often while she would be pregnant  She will Return in about 1 year (around 4/19/2023)  Subjective:    HPI  Current seizure medications:  1  Lamotrigine 200 mg twice a day  2  Folic acid 1 mg daily  Other medications as per Epic  Since her last visit, she has continued to be seizure free and denies any side effects with lamotrigine  She continues to take folic acid along with a prenatal vitamin  She is engaged and planned to get  next month  She and her future  are planning on having children  Prior Seizure Medications: none    I reviewed her labs from 4/30/2021 including vitamin D, which was 53 4 and lamotrigine was 10 5       Objective:    Blood pressure 110/70, height 5' 3" (1 6 m), weight 52 2 kg (115 lb), not currently breastfeeding  Physical Exam    Neurological Exam      ROS:    Review of Systems   Constitutional: Negative  Negative for appetite change and fever  HENT: Negative  Negative for hearing loss, tinnitus, trouble swallowing and voice change  Eyes: Negative    Negative for photophobia and pain  Respiratory: Negative  Negative for shortness of breath  Cardiovascular: Negative  Negative for palpitations  Gastrointestinal: Negative  Negative for nausea and vomiting  Endocrine: Negative  Negative for cold intolerance  Genitourinary: Negative  Negative for dysuria, frequency and urgency  Musculoskeletal: Negative  Negative for myalgias and neck pain  Skin: Negative  Negative for rash  Neurological: Negative  Negative for dizziness, tremors, seizures, syncope, facial asymmetry, speech difficulty, weakness, light-headedness, numbness and headaches  Hematological: Negative  Does not bruise/bleed easily  Psychiatric/Behavioral: Negative  Negative for confusion, hallucinations and sleep disturbance  All other systems reviewed and are negative  I personally reviewed the ROS that was entered by the medical assistant    Voice recognition software was used in the generation of this note  There may be unintentional errors including grammatical errors, spelling errors, or pronoun errors

## 2022-04-19 NOTE — PATIENT INSTRUCTIONS
-- Continue to take Lamotrigine and folic acid unchanged  -- Please get a lamotrigine level checked       -- Please call our office if you get pregnant, as we would want to follow your medication levels more closely

## 2022-05-10 ENCOUNTER — APPOINTMENT (OUTPATIENT)
Dept: LAB | Facility: CLINIC | Age: 37
End: 2022-05-10
Payer: COMMERCIAL

## 2022-05-10 DIAGNOSIS — G40.109 LOCALIZATION-RELATED EPILEPSY (HCC): ICD-10-CM

## 2022-05-10 PROCEDURE — 36415 COLL VENOUS BLD VENIPUNCTURE: CPT

## 2022-05-10 PROCEDURE — 80175 DRUG SCREEN QUAN LAMOTRIGINE: CPT

## 2022-05-12 LAB — LAMOTRIGINE SERPL-MCNC: 8.8 UG/ML (ref 2–20)

## 2023-12-12 NOTE — TELEPHONE ENCOUNTER
Inform pt, I sent to pharm macrobid x 7 d, await UA C+S
Lm pt's as that presc for Macrobid bid x 7 days escribed to [facility]  Will recall with urine culture results 
Pt tx mid 9/2018 for UTI - (+) E coli on urine C&S susceptible to Macrobid - pt's sx resolved now recurrence urinary frequency, dysuria, urgency - will have U/S, C&S ([facility]) - lab orders in Epic  Do you want her to take Macrobid again or other antibiotic?
Yes